# Patient Record
Sex: FEMALE | Race: WHITE | NOT HISPANIC OR LATINO | URBAN - METROPOLITAN AREA
[De-identification: names, ages, dates, MRNs, and addresses within clinical notes are randomized per-mention and may not be internally consistent; named-entity substitution may affect disease eponyms.]

---

## 2018-06-21 ENCOUNTER — OUTPATIENT (OUTPATIENT)
Dept: OUTPATIENT SERVICES | Facility: HOSPITAL | Age: 78
LOS: 1 days | Discharge: HOME | End: 2018-06-21

## 2018-06-21 DIAGNOSIS — Z12.31 ENCOUNTER FOR SCREENING MAMMOGRAM FOR MALIGNANT NEOPLASM OF BREAST: ICD-10-CM

## 2019-10-07 ENCOUNTER — OUTPATIENT (OUTPATIENT)
Dept: OUTPATIENT SERVICES | Facility: HOSPITAL | Age: 79
LOS: 1 days | Discharge: HOME | End: 2019-10-07
Payer: MEDICARE

## 2019-10-07 DIAGNOSIS — Z12.31 ENCOUNTER FOR SCREENING MAMMOGRAM FOR MALIGNANT NEOPLASM OF BREAST: ICD-10-CM

## 2019-10-07 PROCEDURE — 77067 SCR MAMMO BI INCL CAD: CPT | Mod: 26

## 2019-10-07 PROCEDURE — 77063 BREAST TOMOSYNTHESIS BI: CPT | Mod: 26

## 2020-01-02 PROBLEM — Z00.00 ENCOUNTER FOR PREVENTIVE HEALTH EXAMINATION: Status: ACTIVE | Noted: 2020-01-02

## 2020-02-18 ENCOUNTER — OUTPATIENT (OUTPATIENT)
Dept: OUTPATIENT SERVICES | Facility: HOSPITAL | Age: 80
LOS: 1 days | Discharge: HOME | End: 2020-02-18

## 2020-02-19 DIAGNOSIS — M89.9 DISORDER OF BONE, UNSPECIFIED: ICD-10-CM

## 2020-02-19 DIAGNOSIS — Z78.0 ASYMPTOMATIC MENOPAUSAL STATE: ICD-10-CM

## 2020-02-19 DIAGNOSIS — Z13.820 ENCOUNTER FOR SCREENING FOR OSTEOPOROSIS: ICD-10-CM

## 2021-01-13 ENCOUNTER — OUTPATIENT (OUTPATIENT)
Dept: OUTPATIENT SERVICES | Facility: HOSPITAL | Age: 81
LOS: 1 days | Discharge: HOME | End: 2021-01-13
Payer: MEDICARE

## 2021-01-13 DIAGNOSIS — Z12.31 ENCOUNTER FOR SCREENING MAMMOGRAM FOR MALIGNANT NEOPLASM OF BREAST: ICD-10-CM

## 2021-01-13 PROCEDURE — 77067 SCR MAMMO BI INCL CAD: CPT | Mod: 26

## 2021-01-13 PROCEDURE — 77063 BREAST TOMOSYNTHESIS BI: CPT | Mod: 26

## 2022-03-22 ENCOUNTER — OUTPATIENT (OUTPATIENT)
Dept: OUTPATIENT SERVICES | Facility: HOSPITAL | Age: 82
LOS: 1 days | Discharge: HOME | End: 2022-03-22
Payer: MEDICARE

## 2022-03-22 DIAGNOSIS — Z12.31 ENCOUNTER FOR SCREENING MAMMOGRAM FOR MALIGNANT NEOPLASM OF BREAST: ICD-10-CM

## 2022-03-22 PROCEDURE — 77063 BREAST TOMOSYNTHESIS BI: CPT | Mod: 26

## 2022-03-22 PROCEDURE — 77067 SCR MAMMO BI INCL CAD: CPT | Mod: 26

## 2022-03-23 DIAGNOSIS — Z13.820 ENCOUNTER FOR SCREENING FOR OSTEOPOROSIS: ICD-10-CM

## 2022-03-23 DIAGNOSIS — M89.9 DISORDER OF BONE, UNSPECIFIED: ICD-10-CM

## 2022-03-23 DIAGNOSIS — Z78.0 ASYMPTOMATIC MENOPAUSAL STATE: ICD-10-CM

## 2022-10-07 ENCOUNTER — APPOINTMENT (OUTPATIENT)
Dept: ORTHOPEDIC SURGERY | Facility: CLINIC | Age: 82
End: 2022-10-07

## 2022-10-07 DIAGNOSIS — E11.9 TYPE 2 DIABETES MELLITUS W/OUT COMPLICATIONS: ICD-10-CM

## 2022-10-07 PROCEDURE — 20610 DRAIN/INJ JOINT/BURSA W/O US: CPT | Mod: LT

## 2022-10-07 PROCEDURE — 99213 OFFICE O/P EST LOW 20 MIN: CPT | Mod: 25

## 2022-10-07 NOTE — IMAGING
[de-identified] : Left Knee: Palpation of the knee is as follows:  pain to palpation medial and lateral joint line Knee Range of Motion is as follows:  good range of motion with pain to medial lateral joint line Strength examination of the knee is as follows:  good strength throughout Gait and function is as follows:  ambulation with cane

## 2022-10-07 NOTE — DISCUSSION/SUMMARY
[de-identified] : Discussed x-rays with patient showing severe osteoarthritis with medial joint line narrowing.  Discussed treatment options patient including rest, anti-inflammatories, range of motion exercise, physical therapy, cortisone injection,  gel injection, knee replacement surgery. patient agreed to cortisone injection.  Patient states her diabetes is well controlled.\par \par Dexamethasone and Lidocaine  \par \par Anesthesia: ethyl chloride sprayed topically. Dexamethasone 20mg/5mL 2 cc.  \par \par Lidocaine 1%: 2 cc.  \par \par   \par \par Medication was injected in the left knee after verbal consent using sterile preparation and technique. The risks, benefits, and alternatives to cortisone injection were explained in full to the patient. Risks outlined include but are not limited to infection, sepsis, bleeding, scarring, skin discoloration, temporary increase in pain, syncopal episode, failure to resolve symptoms, allergic reaction, symptom recurrence, and elevation of blood sugar in diabetics. Patient understood the risks. All questions were answered. After discussion of options, patient requested an injection. Oral informed consent was obtained and sterile prep was done of the injection site. Sterile technique was utilized for the procedure including the preparation of the solutions used for the injection. Patient tolerated the procedure well. Advised to ice the injection site this evening. \par \par  follow-up in 4 months for re-evaluation.  Call if any questions or concerns.  Patient understands agrees with plan.  Seen under supervision of Dr. Hayden. \par

## 2022-10-07 NOTE — HISTORY OF PRESENT ILLNESS
[de-identified] : Patient is an 82 year old female here for follow-up left knee osteoarthritis.  Patient states overall her last cortisone injection helped greatly.  Patient states over the past 2 weeks her pain has started to worsen again without injury or trauma.  Patient is interested in repeat cortisone injection.  Denies numbness and tingling, denies instability.

## 2023-01-06 ENCOUNTER — INPATIENT (INPATIENT)
Facility: HOSPITAL | Age: 83
LOS: 6 days | Discharge: NURSING, REHAB OR LONG-TERM CARE | End: 2023-01-13
Attending: HOSPITALIST | Admitting: HOSPITALIST
Payer: MEDICARE

## 2023-01-06 ENCOUNTER — EMERGENCY (EMERGENCY)
Facility: HOSPITAL | Age: 83
LOS: 0 days | Discharge: HOME | End: 2023-01-06
Payer: MEDICARE

## 2023-01-06 VITALS
SYSTOLIC BLOOD PRESSURE: 122 MMHG | RESPIRATION RATE: 20 BRPM | HEART RATE: 80 BPM | DIASTOLIC BLOOD PRESSURE: 58 MMHG | OXYGEN SATURATION: 97 % | TEMPERATURE: 100 F

## 2023-01-06 DIAGNOSIS — Z02.9 ENCOUNTER FOR ADMINISTRATIVE EXAMINATIONS, UNSPECIFIED: ICD-10-CM

## 2023-01-06 LAB
ALBUMIN SERPL ELPH-MCNC: 3.7 G/DL — SIGNIFICANT CHANGE UP (ref 3.5–5.2)
ALP SERPL-CCNC: 96 U/L — SIGNIFICANT CHANGE UP (ref 30–115)
ALT FLD-CCNC: 13 U/L — SIGNIFICANT CHANGE UP (ref 0–41)
ANION GAP SERPL CALC-SCNC: 12 MMOL/L — SIGNIFICANT CHANGE UP (ref 7–14)
APPEARANCE UR: ABNORMAL
APTT BLD: 27.8 SEC — SIGNIFICANT CHANGE UP (ref 27–39.2)
AST SERPL-CCNC: 35 U/L — SIGNIFICANT CHANGE UP (ref 0–41)
BACTERIA # UR AUTO: ABNORMAL
BASE EXCESS BLDV CALC-SCNC: 2.7 MMOL/L — SIGNIFICANT CHANGE UP (ref -2–3)
BASOPHILS # BLD AUTO: 0.03 K/UL — SIGNIFICANT CHANGE UP (ref 0–0.2)
BASOPHILS NFR BLD AUTO: 0.3 % — SIGNIFICANT CHANGE UP (ref 0–1)
BILIRUB SERPL-MCNC: 1.4 MG/DL — HIGH (ref 0.2–1.2)
BILIRUB UR-MCNC: NEGATIVE — SIGNIFICANT CHANGE UP
BUN SERPL-MCNC: 20 MG/DL — SIGNIFICANT CHANGE UP (ref 10–20)
CA-I SERPL-SCNC: 1.2 MMOL/L — SIGNIFICANT CHANGE UP (ref 1.15–1.33)
CALCIUM SERPL-MCNC: 9.4 MG/DL — SIGNIFICANT CHANGE UP (ref 8.4–10.5)
CHLORIDE SERPL-SCNC: 99 MMOL/L — SIGNIFICANT CHANGE UP (ref 98–110)
CO2 SERPL-SCNC: 27 MMOL/L — SIGNIFICANT CHANGE UP (ref 17–32)
COLOR SPEC: YELLOW — SIGNIFICANT CHANGE UP
CREAT SERPL-MCNC: 0.9 MG/DL — SIGNIFICANT CHANGE UP (ref 0.7–1.5)
DIFF PNL FLD: ABNORMAL
EGFR: 65 ML/MIN/1.73M2 — SIGNIFICANT CHANGE UP
EOSINOPHIL # BLD AUTO: 0.05 K/UL — SIGNIFICANT CHANGE UP (ref 0–0.7)
EOSINOPHIL NFR BLD AUTO: 0.5 % — SIGNIFICANT CHANGE UP (ref 0–8)
EPI CELLS # UR: 6 /HPF — HIGH (ref 0–5)
FLUAV AG NPH QL: SIGNIFICANT CHANGE UP
FLUBV AG NPH QL: SIGNIFICANT CHANGE UP
GAS PNL BLDV: 135 MMOL/L — LOW (ref 136–145)
GAS PNL BLDV: SIGNIFICANT CHANGE UP
GAS PNL BLDV: SIGNIFICANT CHANGE UP
GLUCOSE SERPL-MCNC: 105 MG/DL — HIGH (ref 70–99)
GLUCOSE UR QL: NEGATIVE — SIGNIFICANT CHANGE UP
HCO3 BLDV-SCNC: 29 MMOL/L — SIGNIFICANT CHANGE UP (ref 22–29)
HCT VFR BLD CALC: 41.1 % — SIGNIFICANT CHANGE UP (ref 37–47)
HCT VFR BLDA CALC: 41 % — SIGNIFICANT CHANGE UP (ref 39–51)
HGB BLD CALC-MCNC: 13.7 G/DL — SIGNIFICANT CHANGE UP (ref 12.6–17.4)
HGB BLD-MCNC: 13.2 G/DL — SIGNIFICANT CHANGE UP (ref 12–16)
HYALINE CASTS # UR AUTO: 2 /LPF — SIGNIFICANT CHANGE UP (ref 0–7)
IMM GRANULOCYTES NFR BLD AUTO: 0.4 % — HIGH (ref 0.1–0.3)
INR BLD: 1.08 RATIO — SIGNIFICANT CHANGE UP (ref 0.65–1.3)
KETONES UR-MCNC: SIGNIFICANT CHANGE UP
LACTATE BLDV-MCNC: 1.7 MMOL/L — SIGNIFICANT CHANGE UP (ref 0.5–2)
LEUKOCYTE ESTERASE UR-ACNC: ABNORMAL
LYMPHOCYTES # BLD AUTO: 0.55 K/UL — LOW (ref 1.2–3.4)
LYMPHOCYTES # BLD AUTO: 5.3 % — LOW (ref 20.5–51.1)
MCHC RBC-ENTMCNC: 26.9 PG — LOW (ref 27–31)
MCHC RBC-ENTMCNC: 32.1 G/DL — SIGNIFICANT CHANGE UP (ref 32–37)
MCV RBC AUTO: 83.9 FL — SIGNIFICANT CHANGE UP (ref 81–99)
MONOCYTES # BLD AUTO: 0.95 K/UL — HIGH (ref 0.1–0.6)
MONOCYTES NFR BLD AUTO: 9.1 % — SIGNIFICANT CHANGE UP (ref 1.7–9.3)
NEUTROPHILS # BLD AUTO: 8.85 K/UL — HIGH (ref 1.4–6.5)
NEUTROPHILS NFR BLD AUTO: 84.4 % — HIGH (ref 42.2–75.2)
NITRITE UR-MCNC: NEGATIVE — SIGNIFICANT CHANGE UP
NRBC # BLD: 0 /100 WBCS — SIGNIFICANT CHANGE UP (ref 0–0)
PCO2 BLDV: 50 MMHG — HIGH (ref 39–42)
PH BLDV: 7.37 — SIGNIFICANT CHANGE UP (ref 7.32–7.43)
PH UR: 6 — SIGNIFICANT CHANGE UP (ref 5–8)
PLATELET # BLD AUTO: 227 K/UL — SIGNIFICANT CHANGE UP (ref 130–400)
PO2 BLDV: 22 MMHG — SIGNIFICANT CHANGE UP
POTASSIUM BLDV-SCNC: 3.7 MMOL/L — SIGNIFICANT CHANGE UP (ref 3.5–5.1)
POTASSIUM SERPL-MCNC: 4.2 MMOL/L — SIGNIFICANT CHANGE UP (ref 3.5–5)
POTASSIUM SERPL-SCNC: 4.2 MMOL/L — SIGNIFICANT CHANGE UP (ref 3.5–5)
PROT SERPL-MCNC: 6.5 G/DL — SIGNIFICANT CHANGE UP (ref 6–8)
PROT UR-MCNC: SIGNIFICANT CHANGE UP
PROTHROM AB SERPL-ACNC: 12.4 SEC — SIGNIFICANT CHANGE UP (ref 9.95–12.87)
RBC # BLD: 4.9 M/UL — SIGNIFICANT CHANGE UP (ref 4.2–5.4)
RBC # FLD: 13.8 % — SIGNIFICANT CHANGE UP (ref 11.5–14.5)
RBC CASTS # UR COMP ASSIST: 17 /HPF — HIGH (ref 0–4)
RSV RNA NPH QL NAA+NON-PROBE: SIGNIFICANT CHANGE UP
SAO2 % BLDV: 33.3 % — SIGNIFICANT CHANGE UP
SARS-COV-2 RNA SPEC QL NAA+PROBE: DETECTED
SODIUM SERPL-SCNC: 138 MMOL/L — SIGNIFICANT CHANGE UP (ref 135–146)
SP GR SPEC: 1.02 — SIGNIFICANT CHANGE UP (ref 1.01–1.03)
TROPONIN T SERPL-MCNC: <0.01 NG/ML — SIGNIFICANT CHANGE UP
UROBILINOGEN FLD QL: ABNORMAL
WBC # BLD: 10.47 K/UL — SIGNIFICANT CHANGE UP (ref 4.8–10.8)
WBC # FLD AUTO: 10.47 K/UL — SIGNIFICANT CHANGE UP (ref 4.8–10.8)
WBC UR QL: 8 /HPF — HIGH (ref 0–5)

## 2023-01-06 PROCEDURE — L9981: CPT

## 2023-01-06 PROCEDURE — 93010 ELECTROCARDIOGRAM REPORT: CPT

## 2023-01-06 PROCEDURE — 72170 X-RAY EXAM OF PELVIS: CPT | Mod: 26

## 2023-01-06 PROCEDURE — 99285 EMERGENCY DEPT VISIT HI MDM: CPT

## 2023-01-06 PROCEDURE — 72125 CT NECK SPINE W/O DYE: CPT | Mod: 26,MA

## 2023-01-06 PROCEDURE — 70450 CT HEAD/BRAIN W/O DYE: CPT | Mod: 26,MA

## 2023-01-06 PROCEDURE — 71045 X-RAY EXAM CHEST 1 VIEW: CPT | Mod: 26

## 2023-01-06 RX ORDER — ACETAMINOPHEN 500 MG
650 TABLET ORAL ONCE
Refills: 0 | Status: COMPLETED | OUTPATIENT
Start: 2023-01-06 | End: 2023-01-06

## 2023-01-06 RX ORDER — CEFTRIAXONE 500 MG/1
1000 INJECTION, POWDER, FOR SOLUTION INTRAMUSCULAR; INTRAVENOUS ONCE
Refills: 0 | Status: COMPLETED | OUTPATIENT
Start: 2023-01-06 | End: 2023-01-06

## 2023-01-06 RX ADMIN — CEFTRIAXONE 100 MILLIGRAM(S): 500 INJECTION, POWDER, FOR SOLUTION INTRAMUSCULAR; INTRAVENOUS at 19:07

## 2023-01-06 RX ADMIN — Medication 650 MILLIGRAM(S): at 22:51

## 2023-01-06 NOTE — ED PROVIDER NOTE - CLINICAL SUMMARY MEDICAL DECISION MAKING FREE TEXT BOX
82-year-old female with past medical history of diabetes, high blood pressure, hyperlipidemia, osteoarthritis, lives at home alone, presents status post fall patient reports she was getting out of the bath felt dizzy more like lightheadedness where she felt like she was going to pass out but never had syncope, fell forward to her knees never hit her head no LOC.  No seizure-like activity, no urinary bowel incontinence, no foaming at the mouth, no tongue biting.  Grandson helped her up off the floor.  Grandson reports that she lives at home alone.  Grandson has been recently sick with COVID.  Pt denies any symptoms at this time and reports baseline. no longer feel dizzy/lh.  Recalls all events. Denies fever, chills, n/v, cp,  pleuritic cp, sob, palpitations, diaphoresis, cough, chest wall/rib pain, clavicular pain, ankle pain, knee pain, shoulder pain, wrist pain, no back pain, no hip pain, no current ha/lh/dizziness, numbness/tingling, neck pain/ stiffness, abd pain, diarrhea, constipation, melena/brbpr, urinary symptoms, edema, calf pain/swelling/erythema, sick contacts, recent travel. GCS 15.    On Exam:  Vital Signs: I have reviewed the initial vital signs.  Constitutional: Non toxic appearing pt sitting on stretcher in nad.   HEAD: No signs of basilar skull fracture.  Integumentary: No rash. No laceration, abrasions, ecchymoses or swelling.   EYES: No periorbital swelling/ecchymoses, EOM intact. No nystagmus. No subconjunctival hemorrhage.   ENT: MMM. No rhinorrhea/otorrhea. No epistaxis,  No septal hematoma. No mastoid ecchymoses. No intraoral bleeding, No loose or cracked teeth, no active bleeding. No malocclusion. No TMJ pain.  NECK: Supple, non-tender, No palpable shelves or step-offs.  BACK: No spinous tenderness. No palpable shelves or step-offs.  Cardiovascular: RRR, radial pulses 2/4 b/l. dp and pt pulses 2/4/ b/l. No pain to palpation to chest wall.  Respiratory: BS present b/l, ctabl, no wheezing or crackles, no stridor. No pain to palpation to ribs b/l. No crepitus. No subq emphysema.   Gastrointestinal: BS present throughout all 4 quadrants, soft, nd, nt. no r/g.  Musculoskeletal: FROM of all extremities. No pain to palpation to b/l knees, femurs, tib-fib, No bony tenderness. No pain to palpation to clavicles, no obvious bony deformities. No hip pain. No short leg. No internal or external rotation of LE  Neurologic: GCS 15. CN II-XII intact, no facial droop or slurring of speech. Motor 5/5 and sensation intact throughout upper and lower extremities. (-) Pronator. NIHSS 0.    Labs and EKG were ordered and reviewed.  Imaging was ordered and reviewed by me.  Appropriate medications for patient's presenting complaints were ordered and effects were reassessed.  No previous ed visits or hospitalizations noted. Additional history was obtained from family.  Escalation to admission/observation was considered.  Patient requires inpatient hospitalization - monitored setting. Pt fell after feeling lh, repeat ct with no acute pathology, ekg with no dianna or elevation, no signs of arrythmia, trop negative, pt with covid and generally weak, imaging reviewed, no acute trauma, medical admitting team aware of pt and admission

## 2023-01-06 NOTE — ED PROVIDER NOTE - ATTENDING CONTRIBUTION TO CARE
82-year-old female with past medical history of diabetes, high blood pressure, hyperlipidemia, osteoarthritis, lives at home alone, presents status post fall patient reports she was getting out of the bath felt dizzy more like lightheadedness where she felt like she was going to pass out but never had syncope, fell forward to her knees never hit her head no LOC.  No seizure-like activity, no urinary bowel incontinence, no foaming at the mouth, no tongue biting.  Grandson helped her up off the floor.  Grandson reports that she lives at home alone.  Grandson has been recently sick with COVID.  Pt denies any symptoms at this time and reports baseline. no longer feel dizzy/lh.  Recalls all events. Denies fever, chills, n/v, cp,  pleuritic cp, sob, palpitations, diaphoresis, cough, chest wall/rib pain, clavicular pain, ankle pain, knee pain, shoulder pain, wrist pain, no back pain, no hip pain, no current ha/lh/dizziness, numbness/tingling, neck pain/ stiffness, abd pain, diarrhea, constipation, melena/brbpr, urinary symptoms, edema, calf pain/swelling/erythema, sick contacts, recent travel. GCS 15.    On Exam:  Vital Signs: I have reviewed the initial vital signs.  Constitutional: Non toxic appearing pt sitting on stretcher in nad.   HEAD: No signs of basilar skull fracture.  Integumentary: No rash. No laceration, abrasions, ecchymoses or swelling.   EYES: No periorbital swelling/ecchymoses, EOM intact. No nystagmus. No subconjunctival hemorrhage.   ENT: MMM. No rhinorrhea/otorrhea. No epistaxis,  No septal hematoma. No mastoid ecchymoses. No intraoral bleeding, No loose or cracked teeth, no active bleeding. No malocclusion. No TMJ pain.  NECK: Supple, non-tender, No palpable shelves or step-offs.  BACK: No spinous tenderness. No palpable shelves or step-offs.  Cardiovascular: RRR, radial pulses 2/4 b/l. dp and pt pulses 2/4/ b/l. No pain to palpation to chest wall.  Respiratory: BS present b/l, ctabl, no wheezing or crackles, no stridor. No pain to palpation to ribs b/l. No crepitus. No subq emphysema.   Gastrointestinal: BS present throughout all 4 quadrants, soft, nd, nt. no r/g.  Musculoskeletal: FROM of all extremities. No pain to palpation to b/l knees, femurs, tib-fib, No bony tenderness. No pain to palpation to clavicles, no obvious bony deformities. No hip pain. No short leg. No internal or external rotation of LE  Neurologic: GCS 15. CN II-XII intact, no facial droop or slurring of speech. Motor 5/5 and sensation intact throughout upper and lower extremities. (-) Pronator. NIHSS 0.

## 2023-01-06 NOTE — ED ADULT TRIAGE NOTE - CHIEF COMPLAINT QUOTE
Pt states she felt dizzy and fell today, fell onto sitting position, no head injury, no use of blood thinners, fs 170 with ems pta

## 2023-01-06 NOTE — ED PROVIDER NOTE - INTERPRETATION
-- DO NOT REPLY / DO NOT REPLY ALL --  -- Message is from the [facility]--    [facility] [employer] Universal Screening: {POS/NE}    Pediatric Specialty Appointment Request    Name: Maksim Virgen  : 1947  MRN: [id number]    Caller Information       Type Contact Phone    2020 01:53 PM CST Phone (Incoming) Maksim Virgen (Self) [phone] (H)          Appointment requested with:  ***  Specialty:  {Specialty List:958736}    Reason for appointment:  ***    Referred by: ***    Insurance verified:  {Yes or No:[id number]}    Patient scheduling preference:  {AM PM:965537}    {DAY OF WEEK:119443}    Patient requests callback: {Yes/No:[id number]::\"No\"}   Callback phone number: ***    {Message Turnaround:240201}-- DO NOT REPLY / DO NOT REPLY ALL --  -- Message is from the [facility]--    [facility]-19 Universal Screening: {POS/NE}    {Message type:[id number]}     normal sinus rhythm

## 2023-01-06 NOTE — ED ADULT NURSE NOTE - OBJECTIVE STATEMENT
patient felt dizzy and fell on her butt. neg head trauma, neg blood thinners, denies pain upon assessment

## 2023-01-06 NOTE — ED PROVIDER NOTE - OBJECTIVE STATEMENT
83 yo female, PMHx of DM, presents with lightheadedness that she felt when she was moving from the bathroom to her room that made her lower herself to the ground on her knees, resolved on its own, no alleviating or aggravating factors, associated with a recent cough. Of note, patient's grandson tested Covid+. Patient lives alone. Denies head trauma, nausea, vomiting, chest pain, shortness of breath, abdominal pain, vision changes, fevers, chills.

## 2023-01-06 NOTE — ED PROVIDER NOTE - PROGRESS NOTE DETAILS
ED Attending SHAWNA Whitaker  Spoke to radiology, discussed well-circumscribed rounded hypodensity in the medulla, states more likely streak artifact but recommend repeat CT head and is unable to exclude hemorrhage or lesion.  Repeat CT head ordered.  Patient aware.

## 2023-01-06 NOTE — ED PROVIDER NOTE - CARE PLAN
Assessment and plan of treatment:	Plan: Monitor, EKG, CXR, pelvic xray, labs, ct head, neck, viral swab , reassess.   Principal Discharge DX:	Fall  Assessment and plan of treatment:	Plan: Monitor, EKG, CXR, pelvic xray, labs, ct head, neck, viral swab , reassess.  Secondary Diagnosis:	Lightheadedness  Secondary Diagnosis:	COVID-19   1

## 2023-01-06 NOTE — ED ADULT NURSE NOTE - NSIMPLEMENTINTERV_GEN_ALL_ED
Implemented All Fall with Harm Risk Interventions:  [place] to call system. Call bell, personal items and telephone within reach. Instruct patient to call for assistance. Room bathroom lighting operational. Non-slip footwear when patient is off stretcher. Physically safe environment: no spills, clutter or unnecessary equipment. Stretcher in lowest position, wheels locked, appropriate side rails in place. Provide visual cue, wrist band, yellow gown, etc. Monitor gait and stability. Monitor for mental status changes and reorient to person, place, and time. Review medications for side effects contributing to fall risk. Reinforce activity limits and safety measures with patient and family. Provide visual clues: red socks.

## 2023-01-06 NOTE — ED PROVIDER NOTE - NS ED ROS FT
GEN:  no fever, no chills, no generalized weakness  NEURO:  no headache, +dizziness  ENT: no sore throat, no runny nose  CV:  no chest pain, no palpitations  RESP:  no sob, +cough  GI:  no nausea, no vomiting, no abdominal pain, no diarrhea  :  no dysuria, no urinary frequency, no hematuria  MSK:  no joint pain, no edema  SKIN:  no rash, no bruising

## 2023-01-06 NOTE — ED PROVIDER NOTE - PHYSICAL EXAMINATION
CONSTITUTIONAL: Well-developed; well-nourished; in no acute distress.   SKIN: warm, dry  HEAD: Normocephalic  EYES: no conjunctival injection  NECK: Supple; non tender.  CARD: S1, S2 normal. Regular rate and rhythm.   RESP: No wheezes, rales or rhonchi.  ABD: soft ntnd  EXT: Normal ROM.  No clubbing, cyanosis or edema.   NEURO: Alert, oriented, grossly unremarkable.  PSYCH: Cooperative, appropriate.

## 2023-01-07 LAB
A1C WITH ESTIMATED AVERAGE GLUCOSE RESULT: 5.5 % — SIGNIFICANT CHANGE UP (ref 4–5.6)
CHOLEST SERPL-MCNC: 108 MG/DL — SIGNIFICANT CHANGE UP
CRP SERPL-MCNC: 60.7 MG/L — HIGH
D DIMER BLD IA.RAPID-MCNC: 1071 NG/ML DDU — HIGH
ERYTHROCYTE [SEDIMENTATION RATE] IN BLOOD: 71 MM/HR — HIGH (ref 0–20)
ESTIMATED AVERAGE GLUCOSE: 111 MG/DL — SIGNIFICANT CHANGE UP (ref 68–114)
GLUCOSE BLDC GLUCOMTR-MCNC: 100 MG/DL — HIGH (ref 70–99)
GLUCOSE BLDC GLUCOMTR-MCNC: 100 MG/DL — HIGH (ref 70–99)
GLUCOSE BLDC GLUCOMTR-MCNC: 111 MG/DL — HIGH (ref 70–99)
GLUCOSE BLDC GLUCOMTR-MCNC: 124 MG/DL — HIGH (ref 70–99)
GLUCOSE BLDC GLUCOMTR-MCNC: 88 MG/DL — SIGNIFICANT CHANGE UP (ref 70–99)
HDLC SERPL-MCNC: 37 MG/DL — LOW
IRON SATN MFR SERPL: 11 % — LOW (ref 15–50)
IRON SATN MFR SERPL: 27 UG/DL — LOW (ref 35–150)
LACTATE SERPL-SCNC: 1.1 MMOL/L — SIGNIFICANT CHANGE UP (ref 0.7–2)
LDH SERPL L TO P-CCNC: 310 — HIGH (ref 50–242)
LIPID PNL WITH DIRECT LDL SERPL: 56 MG/DL — SIGNIFICANT CHANGE UP
NON HDL CHOLESTEROL: 71 MG/DL — SIGNIFICANT CHANGE UP
NT-PROBNP SERPL-SCNC: 1267 PG/ML — HIGH (ref 0–300)
TIBC SERPL-MCNC: 241 UG/DL — SIGNIFICANT CHANGE UP (ref 220–430)
TRIGL SERPL-MCNC: 77 MG/DL — SIGNIFICANT CHANGE UP
TSH SERPL-MCNC: 2.6 UIU/ML — SIGNIFICANT CHANGE UP (ref 0.27–4.2)
UIBC SERPL-MCNC: 214 UG/DL — SIGNIFICANT CHANGE UP (ref 110–370)

## 2023-01-07 PROCEDURE — 99223 1ST HOSP IP/OBS HIGH 75: CPT

## 2023-01-07 PROCEDURE — 93970 EXTREMITY STUDY: CPT | Mod: 26

## 2023-01-07 RX ORDER — ACETAMINOPHEN 500 MG
650 TABLET ORAL EVERY 6 HOURS
Refills: 0 | Status: DISCONTINUED | OUTPATIENT
Start: 2023-01-07 | End: 2023-01-11

## 2023-01-07 RX ORDER — DEXTROSE 50 % IN WATER 50 %
25 SYRINGE (ML) INTRAVENOUS ONCE
Refills: 0 | Status: DISCONTINUED | OUTPATIENT
Start: 2023-01-07 | End: 2023-01-13

## 2023-01-07 RX ORDER — ENOXAPARIN SODIUM 100 MG/ML
40 INJECTION SUBCUTANEOUS EVERY 24 HOURS
Refills: 0 | Status: DISCONTINUED | OUTPATIENT
Start: 2023-01-07 | End: 2023-01-13

## 2023-01-07 RX ORDER — ATORVASTATIN CALCIUM 80 MG/1
20 TABLET, FILM COATED ORAL AT BEDTIME
Refills: 0 | Status: DISCONTINUED | OUTPATIENT
Start: 2023-01-07 | End: 2023-01-13

## 2023-01-07 RX ORDER — REMDESIVIR 5 MG/ML
INJECTION INTRAVENOUS
Refills: 0 | Status: DISCONTINUED | OUTPATIENT
Start: 2023-01-07 | End: 2023-01-10

## 2023-01-07 RX ORDER — GLUCAGON INJECTION, SOLUTION 0.5 MG/.1ML
1 INJECTION, SOLUTION SUBCUTANEOUS ONCE
Refills: 0 | Status: DISCONTINUED | OUTPATIENT
Start: 2023-01-07 | End: 2023-01-13

## 2023-01-07 RX ORDER — DEXTROSE 50 % IN WATER 50 %
12.5 SYRINGE (ML) INTRAVENOUS ONCE
Refills: 0 | Status: DISCONTINUED | OUTPATIENT
Start: 2023-01-07 | End: 2023-01-13

## 2023-01-07 RX ORDER — FUROSEMIDE 40 MG
20 TABLET ORAL ONCE
Refills: 0 | Status: COMPLETED | OUTPATIENT
Start: 2023-01-07 | End: 2023-01-07

## 2023-01-07 RX ORDER — PANTOPRAZOLE SODIUM 20 MG/1
40 TABLET, DELAYED RELEASE ORAL
Refills: 0 | Status: DISCONTINUED | OUTPATIENT
Start: 2023-01-07 | End: 2023-01-13

## 2023-01-07 RX ORDER — REMDESIVIR 5 MG/ML
200 INJECTION INTRAVENOUS EVERY 24 HOURS
Refills: 0 | Status: COMPLETED | OUTPATIENT
Start: 2023-01-07 | End: 2023-01-07

## 2023-01-07 RX ORDER — REMDESIVIR 5 MG/ML
100 INJECTION INTRAVENOUS EVERY 24 HOURS
Refills: 0 | Status: DISCONTINUED | OUTPATIENT
Start: 2023-01-08 | End: 2023-01-10

## 2023-01-07 RX ORDER — INSULIN LISPRO 100/ML
VIAL (ML) SUBCUTANEOUS
Refills: 0 | Status: DISCONTINUED | OUTPATIENT
Start: 2023-01-07 | End: 2023-01-13

## 2023-01-07 RX ORDER — OXYBUTYNIN CHLORIDE 5 MG
5 TABLET ORAL
Refills: 0 | Status: DISCONTINUED | OUTPATIENT
Start: 2023-01-07 | End: 2023-01-13

## 2023-01-07 RX ORDER — SODIUM CHLORIDE 9 MG/ML
1000 INJECTION, SOLUTION INTRAVENOUS
Refills: 0 | Status: DISCONTINUED | OUTPATIENT
Start: 2023-01-07 | End: 2023-01-13

## 2023-01-07 RX ORDER — DEXTROSE 50 % IN WATER 50 %
15 SYRINGE (ML) INTRAVENOUS ONCE
Refills: 0 | Status: DISCONTINUED | OUTPATIENT
Start: 2023-01-07 | End: 2023-01-13

## 2023-01-07 RX ORDER — ASPIRIN/CALCIUM CARB/MAGNESIUM 324 MG
81 TABLET ORAL DAILY
Refills: 0 | Status: DISCONTINUED | OUTPATIENT
Start: 2023-01-07 | End: 2023-01-13

## 2023-01-07 RX ORDER — LOSARTAN POTASSIUM 100 MG/1
50 TABLET, FILM COATED ORAL DAILY
Refills: 0 | Status: DISCONTINUED | OUTPATIENT
Start: 2023-01-07 | End: 2023-01-08

## 2023-01-07 RX ADMIN — Medication 5 MILLIGRAM(S): at 17:19

## 2023-01-07 RX ADMIN — Medication 20 MILLIGRAM(S): at 04:57

## 2023-01-07 RX ADMIN — REMDESIVIR 200 MILLIGRAM(S): 5 INJECTION INTRAVENOUS at 05:42

## 2023-01-07 RX ADMIN — ATORVASTATIN CALCIUM 20 MILLIGRAM(S): 80 TABLET, FILM COATED ORAL at 22:52

## 2023-01-07 RX ADMIN — ENOXAPARIN SODIUM 40 MILLIGRAM(S): 100 INJECTION SUBCUTANEOUS at 05:43

## 2023-01-07 RX ADMIN — LOSARTAN POTASSIUM 50 MILLIGRAM(S): 100 TABLET, FILM COATED ORAL at 05:44

## 2023-01-07 RX ADMIN — Medication 5 MILLIGRAM(S): at 05:43

## 2023-01-07 RX ADMIN — Medication 81 MILLIGRAM(S): at 12:31

## 2023-01-07 NOTE — H&P ADULT - ASSESSMENT
Plan:   This is an 82-year-old patient with Hx of HTN, DM, DL, presents with lightheadedness that she felt when she was moving from the bathroom to her room causing her to fall on her knees. She did not lose consciousness or hit her head. She's been complaining of cough and fever for the past 3-4 days , + sick contact (her grandson tested COVID+). She has decreased PO intake for 2 days.    Patient is admitted to [facility] for Covid 19 and fall.        Plan:    #Fall- most likely orthostatic hypotension from dehydration  #Covid +  - positive on 1/6/2022, symptoms started on 1/2/2022  - started RDV  - f/u ID cs  - saturating well on RA. no wheezing on exam or hx of COPD. No steroids for now  - no signs of bacterial pneumonia. monitor off abx for now  - f/u procal and inflam markers  - monitor on tele for any arrythmia  - f/u ProBNP    #DM  #DL  - hold home pioglitazone, metformin, and trulicity  - started on SS. monitor FS (goal 140-180)  - start insulin regimen if needed  - f/u A1c          #MISC  - DVT PPx: Lovenox 40 qd. adjust after D-dimer is resulted if needed.  - GI PPx: protonix 40 po daily  - Diet: DASH, Carbs restricted, low salt  - Activity; IAT. pending PT cs  - Dispo: Med tele This is an 82-year-old patient with Hx of HTN, DM, DL, presents with lightheadedness that she felt when she was moving from the bathroom to her room causing her to fall on her knees. She did not lose consciousness or hit her head. She's been complaining of cough and fever for the past 3-4 days , + sick contact (her grandson tested COVID+). She has decreased PO intake for 2 days.    Patient is admitted to [facility] for Covid 19 and fall.        Plan:    #Fall- most likely orthostatic hypotension from dehydration  #Covid +  - positive on 1/6/2022, symptoms started on 1/2/2022  - started RDV  - f/u ID cs  - saturating well on RA. no wheezing on exam or hx of COPD. No steroids for now  - no signs of bacterial pneumonia. monitor off abx for now  - f/u procal and inflam markers  - monitor on tele for any arrythmia  - f/u ProBNP      #DM  #DL  - hold home pioglitazone, metformin, and trulicity  - started on SS. monitor FS (goal 140-180)  - start insulin regimen if needed  - f/u A1c      #MISC  - DVT PPx: Lovenox 40 qd. adjust after D-dimer is resulted if needed.  - GI PPx: protonix 40 po daily  - Diet: DASH, Carbs restricted, low salt  - Activity; IAT. pending PT cs  - Dispo: Med tele This is an 82-year-old patient with Hx of HTN, DM, DL, presents with lightheadedness that she felt when she was moving from the bathroom to her room causing her to fall on her knees. She did not lose consciousness or hit her head. She's been complaining of cough and fever for the past 3-4 days , + sick contact (her grandson tested COVID+). She has decreased PO intake for 2 days.    Patient is admitted to [facility] for Covid 19 and fall.        Plan:    #Fall- most likely orthostatic hypotension from dehydration  #Covid +  - positive on 1/6/2022, symptoms started on 1/2/2022  - started RDV  - f/u ID cs  - saturating well on RA. no wheezing on exam. No steroids for now  - no signs of bacterial pneumonia. monitor off abx for now  - f/u procal and inflam markers  - monitor tele for any arrythmias. f/u TSH  - f/u ProBNP and echo  - UA positive but patient asymptomatic   - f/u orthostatics. if positive--> IVF       #DM  #DL  - hold home pioglitazone, metformin, and trulicity  - started on SS. monitor FS (goal 140-180)  - start insulin regimen if needed  - f/u A1c and lipid profile  - cw statin      #HTN  - cw home losartan and HCT      #MISC  - DVT PPx: Lovenox 40 qd. adjust after D-dimer is resulted if needed.  - GI PPx: protonix 40 po daily  - Diet: DASH, Carbs restricted, low salt  - Activity; IAT. pending PT cs  - Dispo: Med tele

## 2023-01-07 NOTE — H&P ADULT - NSHPPHYSICALEXAM_GEN_ALL_CORE
GENERAL: NAD, well-developed. AAOx3  HEAD:  Atraumatic, Normocephalic.  EYES: conjunctiva and sclera clear  CHEST/LUNG: GBAE. No wheezing or crackles   HEART: regular rate and rhythm; S1/S2.  ABDOMEN: Soft, Nontender, Nondistended  EXTREMITIES: +2 BLE edema.   NEUROLOGY: non-focal; moves all extremities

## 2023-01-07 NOTE — H&P ADULT - NSHPLABSRESULTS_GEN_ALL_CORE
13.2   10.47 )-----------( 227      ( 2023 16:06 )             41.1         138  |  99  |  20  ----------------------------<  105<H>  4.2   |  27  |  0.9    Ca    9.4      2023 16:06    TPro  6.5  /  Alb  3.7  /  TBili  1.4<H>  /  DBili  x   /  AST  35  /  ALT  13  /  AlkPhos  96  01-06    PT/INR - ( 2023 16:06 )   PT: 12.40 sec;   INR: 1.08 ratio         PTT - ( 2023 16:06 )  PTT:27.8 sec  Urinalysis Basic - ( 2023 17:53 )    Color: Yellow / Appearance: Slightly Turbid / S.019 / pH: x  Gluc: x / Ketone: Trace  / Bili: Negative / Urobili: 3 mg/dL   Blood: x / Protein: Trace / Nitrite: Negative   Leuk Esterase: Moderate / RBC: 17 /HPF / WBC 8 /HPF   Sq Epi: x / Non Sq Epi: 6 /HPF / Bacteria: Many        Troponin T, Serum: <0.01 ng/mL (23 @ 16:06)      CARDIAC MARKERS ( 2023 16:06 )  x     / <0.01 ng/mL / x     / x     / x

## 2023-01-07 NOTE — H&P ADULT - ATTENDING COMMENTS
This is an 82-year-old patient with Hx of HTN, DM, DL, presents with lightheadedness that she felt when she was moving from the bathroom to her room causing her to fall on her knees. She did not lose consciousness or hit her head. She's been complaining of cough and fever for the past 3-4 days , + sick contact (her grandson tested COVID+). She has decreased PO intake for 2 days.    Patient is admitted to [facility] for Covid 19 and fall.        Plan:    #Fall- most likely orthostatic hypotension from dehydration  #Covid +  - positive on 1/6/2022, symptoms started on 1/2/2022  - started RDV  - saturating well on RA. no wheezing on exam. No steroids for now  - no signs of bacterial pneumonia. monitor off abx for now  - f/u procal and inflam markers  -  f/u TSH  - f/u ProBNP   - UA positive but patient asymptomatic   - f/u orthostatics.   - ID consult appreciated      #DM  #DL  - hold home pioglitazone, metformin, and trulicity  - started on SS. monitor FS (goal 140-180)  - start insulin regimen if needed  - f/u A1c and lipid profile  - cw statin      #HTN  - cw home losartan and HCT      #MISC  - DVT PPx: Lovenox 40 qd. adjust after D-dimer is resulted if needed.  - GI PPx: protonix 40 po daily  - Diet: DASH, Carbs restricted, low salt  - Activity; IAT. pending PT cs  - Dispo: dispo based on PT george plan dc in 24 -48 hr

## 2023-01-07 NOTE — H&P ADULT - HISTORY OF PRESENT ILLNESS
This is an 82-year-old patient with Hx of HTN, DM, DL, presents with lightheadedness that she felt when she was moving from the bathroom to her room causing her to fall on her knees. She did not lose consciousness of hit her head. She's been complaining of cough and fever for the past 3-4 days , + sick contact (her grandson tested COVID+). She has decreased PO intake.   Patient lives alone, ambulates independently but has a cane and a walker PRN. She denies SOB, chest pain, N/V/D, urinary sx or abnormal BMs.    In the ED vitals were stable. normal O2 sat on RA  Labs:   EKG: NSR with PACs.  Trauma workup negative This is an 82-year-old patient with Hx of HTN, DM, DL, presents with lightheadedness that she felt when she was moving from the bathroom to her room causing her to fall on her knees. She did not lose consciousness or hit her head. She's been complaining of cough and fever for the past 3-4 days , + sick contact (her grandson tested COVID+). She has decreased PO intake for 2 days.  Patient lives alone, ambulates independently but has a cane and a walker PRN. She denies SOB, chest pain, N/V/D, urinary sx or abnormal BMs.    In the ED vitals were stable. normal O2 sat on RA.  Patient was found to be COVID+. Labs notable for T bili 1.5. otherwise normal. normal trops.  EKG: NSR with PACs.  Xray pelvis negative  CTH initially showed possible hemorrhage or mass lesion --> repeat head CT was negative    Patient is admitted to [facility] for Covid 19 and fall.   This is an 82-year-old patient with Hx of HTN, DM, DL, presents with lightheadedness that she felt when she was moving from the bathroom to her room causing her to fall on her knees. She did not lose consciousness or hit her head. She's been complaining of cough and fever for the past 3-4 days , + sick contact (her grandson tested COVID+). She has decreased PO intake for 2 days.  Patient lives alone, ambulates independently but has a cane and a walker PRN. She denies SOB, chest pain, N/V/D, urinary sx or abnormal BMs.    In the ED vitals were stable. normal O2 sat on RA.  Patient was found to be COVID+. Labs notable for T bili 1.5. otherwise normal. normal trops.  EKG: NSR with PACs.  Xray pelvis negative  CXR pending read  CTH initially showed possible hemorrhage or mass lesion --> repeat head CT was negative    Patient is admitted to [facility] for Covid 19 and fall.

## 2023-01-07 NOTE — CONSULT NOTE ADULT - ASSESSMENT
82-year-old patient with Hx of HTN, DM, DL, presents with lightheadedness that she felt when she was moving from the bathroom to her room causing her to fall on her knees. She did not lose consciousness or hit her head. She's been complaining of cough and fever for the past 3-4 days , + sick contact (her grandson tested COVID+).  82-year-old patient with Hx of HTN, DM, DL, presents with lightheadedness that she felt when she was moving from the bathroom to her room causing her to fall on her knees. She did not lose consciousness or hit her head. She's been complaining of cough and fever for the past 3-4 days , + sick contact (her grandson tested COVID+).     IMPRESSION;   COVID with a positive test and asymptomatic  On RA  CXR no GGO  S/p vaccination and no booster.   No bacterial PNA    RECOMMENDATIONS;    mg iv on Day 1, then 100 mg iv D2 and D3. If discharged earlier could change to po Paxlovid q12h for 5 days . Check with pharmacy for drug interactions.   Please do not hesitate to recall ID if any questions arise either through [employer] or through [employer]

## 2023-01-08 LAB
ALBUMIN SERPL ELPH-MCNC: 3.8 G/DL — SIGNIFICANT CHANGE UP (ref 3.5–5.2)
ALP SERPL-CCNC: 80 U/L — SIGNIFICANT CHANGE UP (ref 30–115)
ALT FLD-CCNC: 21 U/L — SIGNIFICANT CHANGE UP (ref 0–41)
ANION GAP SERPL CALC-SCNC: 12 MMOL/L — SIGNIFICANT CHANGE UP (ref 7–14)
AST SERPL-CCNC: 59 U/L — HIGH (ref 0–41)
BASOPHILS # BLD AUTO: 0.03 K/UL — SIGNIFICANT CHANGE UP (ref 0–0.2)
BASOPHILS NFR BLD AUTO: 0.4 % — SIGNIFICANT CHANGE UP (ref 0–1)
BILIRUB SERPL-MCNC: 0.8 MG/DL — SIGNIFICANT CHANGE UP (ref 0.2–1.2)
BUN SERPL-MCNC: 23 MG/DL — HIGH (ref 10–20)
CALCIUM SERPL-MCNC: 9.1 MG/DL — SIGNIFICANT CHANGE UP (ref 8.4–10.4)
CHLORIDE SERPL-SCNC: 100 MMOL/L — SIGNIFICANT CHANGE UP (ref 98–110)
CO2 SERPL-SCNC: 29 MMOL/L — SIGNIFICANT CHANGE UP (ref 17–32)
CREAT SERPL-MCNC: 0.8 MG/DL — SIGNIFICANT CHANGE UP (ref 0.7–1.5)
CULTURE RESULTS: SIGNIFICANT CHANGE UP
EGFR: 74 ML/MIN/1.73M2 — SIGNIFICANT CHANGE UP
EOSINOPHIL # BLD AUTO: 0.15 K/UL — SIGNIFICANT CHANGE UP (ref 0–0.7)
EOSINOPHIL NFR BLD AUTO: 2.1 % — SIGNIFICANT CHANGE UP (ref 0–8)
FERRITIN SERPL-MCNC: 176 NG/ML — HIGH (ref 15–150)
GLUCOSE BLDC GLUCOMTR-MCNC: 103 MG/DL — HIGH (ref 70–99)
GLUCOSE BLDC GLUCOMTR-MCNC: 110 MG/DL — HIGH (ref 70–99)
GLUCOSE BLDC GLUCOMTR-MCNC: 145 MG/DL — HIGH (ref 70–99)
GLUCOSE BLDC GLUCOMTR-MCNC: 150 MG/DL — HIGH (ref 70–99)
GLUCOSE SERPL-MCNC: 85 MG/DL — SIGNIFICANT CHANGE UP (ref 70–99)
HCT VFR BLD CALC: 39.3 % — SIGNIFICANT CHANGE UP (ref 37–47)
HGB BLD-MCNC: 12.8 G/DL — SIGNIFICANT CHANGE UP (ref 12–16)
IMM GRANULOCYTES NFR BLD AUTO: 0.3 % — SIGNIFICANT CHANGE UP (ref 0.1–0.3)
LYMPHOCYTES # BLD AUTO: 1.35 K/UL — SIGNIFICANT CHANGE UP (ref 1.2–3.4)
LYMPHOCYTES # BLD AUTO: 18.9 % — LOW (ref 20.5–51.1)
MAGNESIUM SERPL-MCNC: 1.5 MG/DL — LOW (ref 1.8–2.4)
MCHC RBC-ENTMCNC: 27 PG — SIGNIFICANT CHANGE UP (ref 27–31)
MCHC RBC-ENTMCNC: 32.6 G/DL — SIGNIFICANT CHANGE UP (ref 32–37)
MCV RBC AUTO: 82.9 FL — SIGNIFICANT CHANGE UP (ref 81–99)
MONOCYTES # BLD AUTO: 0.88 K/UL — HIGH (ref 0.1–0.6)
MONOCYTES NFR BLD AUTO: 12.3 % — HIGH (ref 1.7–9.3)
NEUTROPHILS # BLD AUTO: 4.73 K/UL — SIGNIFICANT CHANGE UP (ref 1.4–6.5)
NEUTROPHILS NFR BLD AUTO: 66 % — SIGNIFICANT CHANGE UP (ref 42.2–75.2)
NRBC # BLD: 0 /100 WBCS — SIGNIFICANT CHANGE UP (ref 0–0)
PLATELET # BLD AUTO: 220 K/UL — SIGNIFICANT CHANGE UP (ref 130–400)
POTASSIUM SERPL-MCNC: 3.6 MMOL/L — SIGNIFICANT CHANGE UP (ref 3.5–5)
POTASSIUM SERPL-SCNC: 3.6 MMOL/L — SIGNIFICANT CHANGE UP (ref 3.5–5)
PROCALCITONIN SERPL-MCNC: 0.26 NG/ML — HIGH (ref 0.02–0.1)
PROT SERPL-MCNC: 6.2 G/DL — SIGNIFICANT CHANGE UP (ref 6–8)
RBC # BLD: 4.74 M/UL — SIGNIFICANT CHANGE UP (ref 4.2–5.4)
RBC # FLD: 13.8 % — SIGNIFICANT CHANGE UP (ref 11.5–14.5)
SODIUM SERPL-SCNC: 141 MMOL/L — SIGNIFICANT CHANGE UP (ref 135–146)
SPECIMEN SOURCE: SIGNIFICANT CHANGE UP
WBC # BLD: 7.16 K/UL — SIGNIFICANT CHANGE UP (ref 4.8–10.8)
WBC # FLD AUTO: 7.16 K/UL — SIGNIFICANT CHANGE UP (ref 4.8–10.8)

## 2023-01-08 RX ORDER — MAGNESIUM SULFATE 500 MG/ML
2 VIAL (ML) INJECTION ONCE
Refills: 0 | Status: COMPLETED | OUTPATIENT
Start: 2023-01-08 | End: 2023-01-08

## 2023-01-08 RX ADMIN — PANTOPRAZOLE SODIUM 40 MILLIGRAM(S): 20 TABLET, DELAYED RELEASE ORAL at 10:39

## 2023-01-08 RX ADMIN — ATORVASTATIN CALCIUM 20 MILLIGRAM(S): 80 TABLET, FILM COATED ORAL at 22:41

## 2023-01-08 RX ADMIN — Medication 5 MILLIGRAM(S): at 17:45

## 2023-01-08 RX ADMIN — Medication 25 GRAM(S): at 13:07

## 2023-01-08 RX ADMIN — Medication 5 MILLIGRAM(S): at 05:11

## 2023-01-08 RX ADMIN — Medication 81 MILLIGRAM(S): at 17:45

## 2023-01-08 RX ADMIN — REMDESIVIR 200 MILLIGRAM(S): 5 INJECTION INTRAVENOUS at 10:38

## 2023-01-08 RX ADMIN — LOSARTAN POTASSIUM 50 MILLIGRAM(S): 100 TABLET, FILM COATED ORAL at 05:11

## 2023-01-08 RX ADMIN — ENOXAPARIN SODIUM 40 MILLIGRAM(S): 100 INJECTION SUBCUTANEOUS at 06:03

## 2023-01-08 NOTE — PROGRESS NOTE ADULT - SUBJECTIVE AND OBJECTIVE BOX
SUBJECTIVE:    Patient is a 82y old Female who presents with a chief complaint of Fall s/p lightheadedness (2023 08:18)    Currently admitted to medicine with the primary diagnosis of Fall       Today is hospital day 2d. This morning she is resting comfortably in bed and reports no new issues or overnight events.         ALLERGIES:  No Known Allergies    MEDICATIONS:  STANDING MEDICATIONS  aspirin enteric coated 81 milliGRAM(s) Oral daily  atorvastatin 20 milliGRAM(s) Oral at bedtime  dextrose 5%. 1000 milliLiter(s) IV Continuous <Continuous>  dextrose 5%. 1000 milliLiter(s) IV Continuous <Continuous>  dextrose 50% Injectable 25 Gram(s) IV Push once  dextrose 50% Injectable 12.5 Gram(s) IV Push once  dextrose 50% Injectable 25 Gram(s) IV Push once  enoxaparin Injectable 40 milliGRAM(s) SubCutaneous every 24 hours  glucagon  Injectable 1 milliGRAM(s) IntraMuscular once  hydrochlorothiazide 12.5 milliGRAM(s) Oral daily  insulin lispro (ADMELOG) corrective regimen sliding scale   SubCutaneous Before meals and at bedtime  losartan 50 milliGRAM(s) Oral daily  magnesium sulfate  IVPB 2 Gram(s) IV Intermittent once  oxybutynin 5 milliGRAM(s) Oral two times a day  pantoprazole    Tablet 40 milliGRAM(s) Oral before breakfast  remdesivir  IVPB   IV Intermittent   remdesivir  IVPB 100 milliGRAM(s) IV Intermittent every 24 hours    PRN MEDICATIONS  acetaminophen     Tablet .. 650 milliGRAM(s) Oral every 6 hours PRN  dextrose Oral Gel 15 Gram(s) Oral once PRN    VITALS:   T(F): 97.6  HR: 84  BP: 103/55  RR: 18  SpO2: 98%    LABS:                        12.8   7.16  )-----------( 220      ( 2023 07:11 )             39.3     01-08    141  |  100  |  23<H>  ----------------------------<  85  3.6   |  29  |  0.8    Ca    9.1      2023 07:11  Mg     1.5     -08    TPro  6.2  /  Alb  3.8  /  TBili  0.8  /  DBili  x   /  AST  59<H>  /  ALT  21  /  AlkPhos  80  -08    PT/INR - ( 2023 16:06 )   PT: 12.40 sec;   INR: 1.08 ratio         PTT - ( 2023 16:06 )  PTT:27.8 sec  Urinalysis Basic - ( 2023 17:53 )    Color: Yellow / Appearance: Slightly Turbid / S.019 / pH: x  Gluc: x / Ketone: Trace  / Bili: Negative / Urobili: 3 mg/dL   Blood: x / Protein: Trace / Nitrite: Negative   Leuk Esterase: Moderate / RBC: 17 /HPF / WBC 8 /HPF   Sq Epi: x / Non Sq Epi: 6 /HPF / Bacteria: Many            Culture - Urine (collected 2023 17:53)  Source: Clean Catch Clean Catch (Midstream)  Final Report (2023 09:11):    >=3 organisms. Probable collection contamination.    Culture - Blood (collected 2023 16:06)  Source: .Blood Blood-Peripheral  Preliminary Report (2023 23:01):    No growth to date.    Culture - Blood (collected 2023 16:06)  Source: .Blood Blood-Peripheral  Preliminary Report (2023 23:01):    No growth to date.      CARDIAC MARKERS ( 2023 16:06 )  x     / <0.01 ng/mL / x     / x     / x                PHYSICAL EXAM:  GEN: No acute distress  LUNGS: Clear to auscultation bilaterally   HEART: Regular  ABD: Soft, non-tender, non-distended.  EXT: no edema  NEURO: AAOX3

## 2023-01-08 NOTE — PROGRESS NOTE ADULT - ASSESSMENT
This is an 82-year-old patient with Hx of HTN, DM, DL, presents with lightheadedness that she felt when she was moving from the bathroom to her room causing her to fall on her knees. She did not lose consciousness or hit her head. She's been complaining of cough and fever for the past 3-4 days , + sick contact (her grandson tested COVID+). She has decreased PO intake for 2 days.      Plan:    #Fall- most likely orthostatic hypotension from dehydration  #Covid +  - positive on 1/6/2022, symptoms started on 1/2/2022  - started RDV  - saturating well on RA. no wheezing on exam. No steroids for now  - no signs of bacterial pneumonia. monitor off abx for now  - ID consult appreciated      #DM  #DL  - hold home pioglitazone, metformin, and trulicity  - started on SS. monitor FS (goal 140-180)  - start insulin regimen if needed  - f/u A1c and lipid profile  - cw statin      #HTN  - cw home losartan   - hold HCTZ for now      #MISC  - DVT PPx: Lovenox 40 qd.   - GI PPx: protonix 40 po daily  - Diet: DASH, Carbs restricted, low salt  - physical therapy  anticipate dc tomorrow This is an 82-year-old patient with Hx of HTN, DM, DL, presents with lightheadedness that she felt when she was moving from the bathroom to her room causing her to fall on her knees. She did not lose consciousness or hit her head. She's been complaining of cough and fever for the past 3-4 days , + sick contact (her grandson tested COVID+). She has decreased PO intake for 2 days.      Plan:    #Fall-   most likely orthostatic hypotension from dehydration  responded to iv fluid  f/u troponin  f/u echo  physical therapy      #Covid +  - positive on 1/6/2022, symptoms started on 1/2/2022  - started RDV  - saturating well on RA. no wheezing on exam. No steroids for now  - no signs of bacterial pneumonia. monitor off abx for now  - ID consult appreciated      #DM  #DL  - hold home pioglitazone, metformin, and trulicity  - started on SS. monitor FS (goal 140-180)  - start insulin regimen if needed  - f/u A1c and lipid profile  - cw statin      #HTN  - hold for now      #MISC  - DVT PPx: Lovenox 40 qd.   - GI PPx: protonix 40 po daily  - Diet: DASH, Carbs restricted, low salt  - physical therapy  anticipate dc tomorrow

## 2023-01-09 ENCOUNTER — TRANSCRIPTION ENCOUNTER (OUTPATIENT)
Age: 83
End: 2023-01-09

## 2023-01-09 LAB
ALBUMIN SERPL ELPH-MCNC: 3.6 G/DL — SIGNIFICANT CHANGE UP (ref 3.5–5.2)
ALP SERPL-CCNC: 79 U/L — SIGNIFICANT CHANGE UP (ref 30–115)
ALT FLD-CCNC: 22 U/L — SIGNIFICANT CHANGE UP (ref 0–41)
ANION GAP SERPL CALC-SCNC: 12 MMOL/L — SIGNIFICANT CHANGE UP (ref 7–14)
AST SERPL-CCNC: 50 U/L — HIGH (ref 0–41)
BASOPHILS # BLD AUTO: 0.01 K/UL — SIGNIFICANT CHANGE UP (ref 0–0.2)
BASOPHILS NFR BLD AUTO: 0.1 % — SIGNIFICANT CHANGE UP (ref 0–1)
BILIRUB SERPL-MCNC: 0.9 MG/DL — SIGNIFICANT CHANGE UP (ref 0.2–1.2)
BUN SERPL-MCNC: 22 MG/DL — HIGH (ref 10–20)
CALCIUM SERPL-MCNC: 9.5 MG/DL — SIGNIFICANT CHANGE UP (ref 8.4–10.5)
CHLORIDE SERPL-SCNC: 98 MMOL/L — SIGNIFICANT CHANGE UP (ref 98–110)
CO2 SERPL-SCNC: 30 MMOL/L — SIGNIFICANT CHANGE UP (ref 17–32)
CREAT SERPL-MCNC: 0.8 MG/DL — SIGNIFICANT CHANGE UP (ref 0.7–1.5)
EGFR: 74 ML/MIN/1.73M2 — SIGNIFICANT CHANGE UP
EOSINOPHIL # BLD AUTO: 0.02 K/UL — SIGNIFICANT CHANGE UP (ref 0–0.7)
EOSINOPHIL NFR BLD AUTO: 0.2 % — SIGNIFICANT CHANGE UP (ref 0–8)
GLUCOSE BLDC GLUCOMTR-MCNC: 131 MG/DL — HIGH (ref 70–99)
GLUCOSE BLDC GLUCOMTR-MCNC: 153 MG/DL — HIGH (ref 70–99)
GLUCOSE BLDC GLUCOMTR-MCNC: 158 MG/DL — HIGH (ref 70–99)
GLUCOSE BLDC GLUCOMTR-MCNC: 243 MG/DL — HIGH (ref 70–99)
GLUCOSE SERPL-MCNC: 135 MG/DL — HIGH (ref 70–99)
HCT VFR BLD CALC: 37.2 % — SIGNIFICANT CHANGE UP (ref 37–47)
HGB BLD-MCNC: 12.3 G/DL — SIGNIFICANT CHANGE UP (ref 12–16)
IMM GRANULOCYTES NFR BLD AUTO: 0.4 % — HIGH (ref 0.1–0.3)
LYMPHOCYTES # BLD AUTO: 0.94 K/UL — LOW (ref 1.2–3.4)
LYMPHOCYTES # BLD AUTO: 9.4 % — LOW (ref 20.5–51.1)
MAGNESIUM SERPL-MCNC: 1.5 MG/DL — LOW (ref 1.8–2.4)
MCHC RBC-ENTMCNC: 27.1 PG — SIGNIFICANT CHANGE UP (ref 27–31)
MCHC RBC-ENTMCNC: 33.1 G/DL — SIGNIFICANT CHANGE UP (ref 32–37)
MCV RBC AUTO: 81.9 FL — SIGNIFICANT CHANGE UP (ref 81–99)
MONOCYTES # BLD AUTO: 1.13 K/UL — HIGH (ref 0.1–0.6)
MONOCYTES NFR BLD AUTO: 11.3 % — HIGH (ref 1.7–9.3)
NEUTROPHILS # BLD AUTO: 7.9 K/UL — HIGH (ref 1.4–6.5)
NEUTROPHILS NFR BLD AUTO: 78.6 % — HIGH (ref 42.2–75.2)
NRBC # BLD: 0 /100 WBCS — SIGNIFICANT CHANGE UP (ref 0–0)
PLATELET # BLD AUTO: 207 K/UL — SIGNIFICANT CHANGE UP (ref 130–400)
POTASSIUM SERPL-MCNC: 3.7 MMOL/L — SIGNIFICANT CHANGE UP (ref 3.5–5)
POTASSIUM SERPL-SCNC: 3.7 MMOL/L — SIGNIFICANT CHANGE UP (ref 3.5–5)
PROT SERPL-MCNC: 6.3 G/DL — SIGNIFICANT CHANGE UP (ref 6–8)
RBC # BLD: 4.54 M/UL — SIGNIFICANT CHANGE UP (ref 4.2–5.4)
RBC # FLD: 13.8 % — SIGNIFICANT CHANGE UP (ref 11.5–14.5)
SODIUM SERPL-SCNC: 140 MMOL/L — SIGNIFICANT CHANGE UP (ref 135–146)
TROPONIN T SERPL-MCNC: <0.01 NG/ML — SIGNIFICANT CHANGE UP
WBC # BLD: 10.04 K/UL — SIGNIFICANT CHANGE UP (ref 4.8–10.8)
WBC # FLD AUTO: 10.04 K/UL — SIGNIFICANT CHANGE UP (ref 4.8–10.8)

## 2023-01-09 PROCEDURE — 73620 X-RAY EXAM OF FOOT: CPT | Mod: 26,LT

## 2023-01-09 PROCEDURE — 73562 X-RAY EXAM OF KNEE 3: CPT | Mod: 26,LT

## 2023-01-09 PROCEDURE — 99232 SBSQ HOSP IP/OBS MODERATE 35: CPT

## 2023-01-09 PROCEDURE — 93306 TTE W/DOPPLER COMPLETE: CPT | Mod: 26

## 2023-01-09 RX ORDER — ACETAMINOPHEN 500 MG
2 TABLET ORAL
Qty: 240 | Refills: 0
Start: 2023-01-09 | End: 2023-02-07

## 2023-01-09 RX ORDER — MAGNESIUM OXIDE 400 MG ORAL TABLET 241.3 MG
400 TABLET ORAL EVERY 4 HOURS
Refills: 0 | Status: COMPLETED | OUTPATIENT
Start: 2023-01-09 | End: 2023-01-09

## 2023-01-09 RX ADMIN — PANTOPRAZOLE SODIUM 40 MILLIGRAM(S): 20 TABLET, DELAYED RELEASE ORAL at 08:13

## 2023-01-09 RX ADMIN — Medication 650 MILLIGRAM(S): at 23:23

## 2023-01-09 RX ADMIN — Medication 81 MILLIGRAM(S): at 13:52

## 2023-01-09 RX ADMIN — MAGNESIUM OXIDE 400 MG ORAL TABLET 400 MILLIGRAM(S): 241.3 TABLET ORAL at 20:56

## 2023-01-09 RX ADMIN — Medication 5 MILLIGRAM(S): at 20:56

## 2023-01-09 RX ADMIN — ATORVASTATIN CALCIUM 20 MILLIGRAM(S): 80 TABLET, FILM COATED ORAL at 21:58

## 2023-01-09 RX ADMIN — MAGNESIUM OXIDE 400 MG ORAL TABLET 400 MILLIGRAM(S): 241.3 TABLET ORAL at 13:52

## 2023-01-09 RX ADMIN — Medication 650 MILLIGRAM(S): at 20:56

## 2023-01-09 RX ADMIN — REMDESIVIR 200 MILLIGRAM(S): 5 INJECTION INTRAVENOUS at 05:11

## 2023-01-09 RX ADMIN — Medication 5 MILLIGRAM(S): at 05:11

## 2023-01-09 RX ADMIN — Medication 650 MILLIGRAM(S): at 08:02

## 2023-01-09 NOTE — ED ADULT NURSE REASSESSMENT NOTE - NS ED NURSE REASSESS COMMENT FT1
MD Callum Sandoval was called and made aware of pt's Right arm, redness, warmth and hardness. Md will see pt.

## 2023-01-09 NOTE — PROGRESS NOTE ADULT - SUBJECTIVE AND OBJECTIVE BOX
MATTYPRABHJOTSAUD  82y, Female  Allergy: No Known Allergies    Hospital Day: 3d    Patient seen and examined. No acute events overnight    PMH/PSH:  PAST MEDICAL & SURGICAL HISTORY:      VITALS:  T(F): 96.9 (01-09-23 @ 07:26), Max: 98.4 (01-08-23 @ 16:28)  HR: 78 (01-09-23 @ 07:26)  BP: 111/56 (01-09-23 @ 07:26) (103/56 - 118/56)  RR: 18 (01-09-23 @ 07:26)  SpO2: 97% (01-09-23 @ 07:26)    TESTS & MEASUREMENTS:  Weight (Kg):                             12.3   10.04 )-----------( 207      ( 09 Jan 2023 07:08 )             37.2       01-09    140  |  98  |  22<H>  ----------------------------<  135<H>  3.7   |  30  |  0.8    Ca    9.5      09 Jan 2023 07:08  Mg     1.5     01-09    TPro  6.3  /  Alb  3.6  /  TBili  0.9  /  DBili  x   /  AST  50<H>  /  ALT  22  /  AlkPhos  79  01-09    LIVER FUNCTIONS - ( 09 Jan 2023 07:08 )  Alb: 3.6 g/dL / Pro: 6.3 g/dL / ALK PHOS: 79 U/L / ALT: 22 U/L / AST: 50 U/L / GGT: x           CARDIAC MARKERS ( 09 Jan 2023 07:08 )  x     / <0.01 ng/mL / x     / x     / x            Culture - Urine (collected 01-06-23 @ 17:53)  Source: Clean Catch Clean Catch (Midstream)  Final Report (01-08-23 @ 09:11):    >=3 organisms. Probable collection contamination.    Culture - Blood (collected 01-06-23 @ 16:06)  Source: .Blood Blood-Peripheral  Preliminary Report (01-07-23 @ 23:01):    No growth to date.    Culture - Blood (collected 01-06-23 @ 16:06)  Source: .Blood Blood-Peripheral  Preliminary Report (01-07-23 @ 23:01):    No growth to date.          RADIOLOGY & ADDITIONAL TESTS:    RECENT DIAGNOSTIC ORDERS:  Xray Foot AP + Lateral, Left: Routine   Indication: left foot  Transport: [facility]  Provider's Contact #: [phone] (01-09-23 @ 14:26)      MEDICATIONS:  MEDICATIONS  (STANDING):  aspirin enteric coated 81 milliGRAM(s) Oral daily  atorvastatin 20 milliGRAM(s) Oral at bedtime  dextrose 5%. 1000 milliLiter(s) (100 mL/Hr) IV Continuous <Continuous>  dextrose 5%. 1000 milliLiter(s) (50 mL/Hr) IV Continuous <Continuous>  dextrose 50% Injectable 25 Gram(s) IV Push once  dextrose 50% Injectable 12.5 Gram(s) IV Push once  dextrose 50% Injectable 25 Gram(s) IV Push once  enoxaparin Injectable 40 milliGRAM(s) SubCutaneous every 24 hours  glucagon  Injectable 1 milliGRAM(s) IntraMuscular once  insulin lispro (ADMELOG) corrective regimen sliding scale   SubCutaneous Before meals and at bedtime  oxybutynin 5 milliGRAM(s) Oral two times a day  pantoprazole    Tablet 40 milliGRAM(s) Oral before breakfast  remdesivir  IVPB   IV Intermittent   remdesivir  IVPB 100 milliGRAM(s) IV Intermittent every 24 hours    MEDICATIONS  (PRN):  acetaminophen     Tablet .. 650 milliGRAM(s) Oral every 6 hours PRN Temp greater or equal to 38C (100.4F), Mild Pain (1 - 3)  dextrose Oral Gel 15 Gram(s) Oral once PRN Blood Glucose LESS THAN 70 milliGRAM(s)/deciliter      HOME MEDICATIONS:  aspirin 81 mg oral delayed release tablet (01-07)  atorvastatin 20 mg oral tablet (01-07)  losartan-hydrochlorothiazide 50 mg-12.5 mg oral tablet (01-07)  metFORMIN 500 mg oral tablet, extended release (01-07)  oxybutynin 5 mg oral tablet (01-07)  pioglitazone 30 mg oral tablet (01-07)  Trulicity Pen 0.75 mg/0.5 mL subcutaneous solution (01-07)      REVIEW OF SYSTEMS:  All other review of systems is negative unless indicated above.     PHYSICAL EXAM:  PHYSICAL EXAM:  GENERAL: NAD, well-developed  HEAD:  Atraumatic, Normocephalic  NECK: Supple, No JVD  CHEST/LUNG: Clear to auscultation bilaterally; No wheeze  HEART: Regular rate and rhythm; No murmurs, rubs, or gallops  ABDOMEN: Soft, Nontender, Nondistended; Bowel sounds present  EXTREMITIES:  2+ Peripheral Pulses, No clubbing, cyanosis, or edema; Left knee TTP  SKIN: No rashes or lesions

## 2023-01-09 NOTE — DISCHARGE NOTE PROVIDER - NSDCMRMEDTOKEN_GEN_ALL_CORE_FT
aspirin 81 mg oral delayed release tablet: 1 tab(s) orally once a day  atorvastatin 20 mg oral tablet: 1 tab(s) orally once a day  losartan-hydrochlorothiazide 50 mg-12.5 mg oral tablet: 1 tab(s) orally once a day  metFORMIN 500 mg oral tablet, extended release: 1 tab(s) orally once a day  oxybutynin 5 mg oral tablet: 1 tab(s) orally 2 times a day  pioglitazone 30 mg oral tablet: 1 tab(s) orally once a day  Trulicity Pen 0.75 mg/0.5 mL subcutaneous solution: 1 application subcutaneous once a week   aspirin 81 mg oral delayed release tablet: 1 tab(s) orally once a day  atorvastatin 20 mg oral tablet: 1 tab(s) orally once a day  metFORMIN 500 mg oral tablet, extended release: 1 tab(s) orally once a day  oxybutynin 5 mg oral tablet: 1 tab(s) orally 2 times a day  Trulicity Pen 0.75 mg/0.5 mL subcutaneous solution: 1 application subcutaneous once a week

## 2023-01-09 NOTE — CONSULT NOTE ADULT - SUBJECTIVE AND OBJECTIVE BOX
SAUD VILLAVICENCIO  82y, Female  Allergy: No Known Allergies      All historical available data reviewed.    HPI:  This is an 82-year-old patient with Hx of HTN, DM, DL, presents with lightheadedness that she felt when she was moving from the bathroom to her room causing her to fall on her knees. She did not lose consciousness or hit her head. She's been complaining of cough and fever for the past 3-4 days , + sick contact (her grandson tested COVID+). She has decreased PO intake for 2 days.  Patient lives alone, ambulates independently but has a cane and a walker PRN. She denies SOB, chest pain, N/V/D, urinary sx or abnormal BMs.    In the ED vitals were stable. normal O2 sat on RA.  Patient was found to be COVID+. Labs notable for T bili 1.5. otherwise normal. normal trops.  EKG: NSR with PACs.  Xray pelvis negative  CXR pending read  CTH initially showed possible hemorrhage or mass lesion --> repeat head CT was negative    Patient is admitted to [facility] for Covid 19 and fall.   (2023 03:46)    FAMILY HISTORY:    PAST MEDICAL & SURGICAL HISTORY:        VITALS:  T(F): 98.3, Max: 100.5 (23 @ 00:02)  HR: 81  BP: 122/60  RR: 19Vital Signs Last 24 Hrs  T(C): 36.8 (2023 08:00), Max: 38.1 (2023 00:02)  T(F): 98.3 (2023 08:00), Max: 100.5 (2023 00:02)  HR: 81 (2023 08:00) (79 - 88)  BP: 122/60 (2023 08:00) (122/58 - 126/58)  BP(mean): --  RR: 19 (2023 08:00) (18 - 20)  SpO2: 97% (2023 08:00) (96% - 97%)    Parameters below as of 2023 08:00  Patient On (Oxygen Delivery Method): room air        TESTS & MEASUREMENTS:                        13.2   10.47 )-----------( 227      ( 2023 16:06 )             41.1     -    138  |  99  |  20  ----------------------------<  105<H>  4.2   |  27  |  0.9    Ca    9.4      2023 16:06    TPro  6.5  /  Alb  3.7  /  TBili  1.4<H>  /  DBili  x   /  AST  35  /  ALT  13  /  AlkPhos  96  -    LIVER FUNCTIONS - ( 2023 16:06 )  Alb: 3.7 g/dL / Pro: 6.5 g/dL / ALK PHOS: 96 U/L / ALT: 13 U/L / AST: 35 U/L / GGT: x             Urinalysis Basic - ( 2023 17:53 )    Color: Yellow / Appearance: Slightly Turbid / S.019 / pH: x  Gluc: x / Ketone: Trace  / Bili: Negative / Urobili: 3 mg/dL   Blood: x / Protein: Trace / Nitrite: Negative   Leuk Esterase: Moderate / RBC: 17 /HPF / WBC 8 /HPF   Sq Epi: x / Non Sq Epi: 6 /HPF / Bacteria: Many          RADIOLOGY & ADDITIONAL TESTS:  Personal review of radiological diagnostics performed  Echo and EKG results noted when applicable.     MEDICATIONS:  acetaminophen     Tablet .. 650 milliGRAM(s) Oral every 6 hours PRN  aspirin enteric coated 81 milliGRAM(s) Oral daily  atorvastatin 20 milliGRAM(s) Oral at bedtime  dextrose 5%. 1000 milliLiter(s) IV Continuous <Continuous>  dextrose 5%. 1000 milliLiter(s) IV Continuous <Continuous>  dextrose 50% Injectable 25 Gram(s) IV Push once  dextrose 50% Injectable 12.5 Gram(s) IV Push once  dextrose 50% Injectable 25 Gram(s) IV Push once  dextrose Oral Gel 15 Gram(s) Oral once PRN  enoxaparin Injectable 40 milliGRAM(s) SubCutaneous every 24 hours  glucagon  Injectable 1 milliGRAM(s) IntraMuscular once  hydrochlorothiazide 12.5 milliGRAM(s) Oral daily  insulin lispro (ADMELOG) corrective regimen sliding scale   SubCutaneous Before meals and at bedtime  losartan 50 milliGRAM(s) Oral daily  oxybutynin 5 milliGRAM(s) Oral two times a day  pantoprazole    Tablet 40 milliGRAM(s) Oral before breakfast  remdesivir  IVPB   IV Intermittent       ANTIBIOTICS:  remdesivir  IVPB   IV Intermittent     
Orthopaedic Surgery Consult Note    For Surgeon:    HPI:  82yFemale  Patient is a 82y old  Female who presents with a chief complaint of Fall s/p lightheadedness (09 Jan 2023 11:09)    HPI:  This is an 82-year-old patient with Hx of HTN, DM, DL, presents with lightheadedness that she felt when she was moving from the bathroom to her room causing her to fall on her knees. She did not lose consciousness or hit her head. She's been complaining of cough and fever for the past 3-4 days , + sick contact (her grandson tested COVID+). She has decreased PO intake for 2 days.  Patient lives alone, ambulates independently but has a cane and a walker PRN. She denies SOB, chest pain, N/V/D, urinary sx or abnormal BMs.  pt seen in ed.   pt started complaining of left knee pain s/p fall.  pt also has a left foot pain.   pt denies head trauma and any other pain.   pt is a independent ambulator       Allergies    No Known Allergies    Intolerances      PAST MEDICAL & SURGICAL HISTORY:    MEDICATIONS  (STANDING):  aspirin enteric coated 81 milliGRAM(s) Oral daily  atorvastatin 20 milliGRAM(s) Oral at bedtime  dextrose 5%. 1000 milliLiter(s) (100 mL/Hr) IV Continuous <Continuous>  dextrose 5%. 1000 milliLiter(s) (50 mL/Hr) IV Continuous <Continuous>  dextrose 50% Injectable 25 Gram(s) IV Push once  dextrose 50% Injectable 12.5 Gram(s) IV Push once  dextrose 50% Injectable 25 Gram(s) IV Push once  enoxaparin Injectable 40 milliGRAM(s) SubCutaneous every 24 hours  glucagon  Injectable 1 milliGRAM(s) IntraMuscular once  insulin lispro (ADMELOG) corrective regimen sliding scale   SubCutaneous Before meals and at bedtime  oxybutynin 5 milliGRAM(s) Oral two times a day  pantoprazole    Tablet 40 milliGRAM(s) Oral before breakfast  remdesivir  IVPB   IV Intermittent   remdesivir  IVPB 100 milliGRAM(s) IV Intermittent every 24 hours    MEDICATIONS  (PRN):  acetaminophen     Tablet .. 650 milliGRAM(s) Oral every 6 hours PRN Temp greater or equal to 38C (100.4F), Mild Pain (1 - 3)  dextrose Oral Gel 15 Gram(s) Oral once PRN Blood Glucose LESS THAN 70 milliGRAM(s)/deciliter      Vital Signs Last 24 Hrs  T(C): 36.1 (09 Jan 2023 07:26), Max: 36.9 (08 Jan 2023 16:28)  T(F): 96.9 (09 Jan 2023 07:26), Max: 98.4 (08 Jan 2023 16:28)  HR: 78 (09 Jan 2023 07:26) (68 - 78)  BP: 111/56 (09 Jan 2023 07:26) (103/56 - 118/56)  BP(mean): --  RR: 18 (09 Jan 2023 07:26) (16 - 18)  SpO2: 97% (09 Jan 2023 07:26) (97% - 98%)    Parameters below as of 09 Jan 2023 07:26  Patient On (Oxygen Delivery Method): room air        Physical Exam:  left knee:   skin intact,   effusion present, no ecchymosis present, global pain about the knee,     can straight leg raise and extend knee with support behind the posterior thigh.    no palpable defect noted,    no pain with log roll,   no pain at ankle,    pain with palpation at mid foot, no ecchymosis or swelling about the foot,    nvid                         12.3   10.04 )-----------( 207      ( 09 Jan 2023 07:08 )             37.2     01-09    140  |  98  |  22<H>  ----------------------------<  135<H>  3.7   |  30  |  0.8    Ca    9.5      09 Jan 2023 07:08  Mg     1.5     01-09    TPro  6.3  /  Alb  3.6  /  TBili  0.9  /  DBili  x   /  AST  50<H>  /  ALT  22  /  AlkPhos  79  01-09      Imaging: left knee xray shows left knee oa,   effusion present no fracture seen     A/P: 82yFemale    -Discussed with

## 2023-01-09 NOTE — PROGRESS NOTE ADULT - ASSESSMENT
82-year-old patient with Hx of HTN, DM, DL, presents with lightheadedness that she felt when she was moving from the bathroom to her room causing her to fall on her knees. She did not lose consciousness or hit her head. She's been complaining of cough and fever for the past 3-4 days , + sick contact (her grandson tested COVID+). She has decreased PO intake for 2 days.    #Fall  #Left knee effusion likely due to osteoarthritis + trauma  XR knee, left (01/09: Large knee joint effusion; No fractures  Echo 01/09: 60-65% EF  most likely orthostatic hypotension from dehydration  responded to iv fluid  Pt was unable to ambulate with PT today due to severe left knee pain  sp Ortho eval - recommend cold compress therapy, XR left foot, WBAT  - Pending PT re-eval tomorrow for disposition  - f/u XR left foot   - DC telemetry    #Covid +  positive on 1/6/2022, symptoms started on 1/2/2022  sp RDV x3d  - saturating well on RA. no wheezing on exam. No steroids for now  - no signs of bacterial pneumonia. monitor off abx for now  - ID consult appreciated    #DM  #DL  - hold home pioglitazone, metformin, and trulicity  - Cont ISS. monitor FS (goal 140-180)  - f/u A1c and lipid profile  - cw statin    #HTN  - hold for now    DVT PPX, Lovenox    #Progress Note Handoff  Pending (specify): PT re-eval, pain control  Family discussion: d/w pt regarding dc planning   Disposition: [facility]

## 2023-01-09 NOTE — ED ADULT NURSE REASSESSMENT NOTE - NS ED NURSE REASSESS COMMENT FT1
Pt got tired of the monitor beeping and disconnected her self from the monitor. Stated " I cant do this anymore with this monitor".

## 2023-01-09 NOTE — DISCHARGE NOTE PROVIDER - HOSPITAL COURSE
This is an 82-year-old patient with Hx of HTN, DM, DL, presents with lightheadedness that she felt when she was moving from the bathroom to her room causing her to fall on her knees. She did not lose consciousness or hit her head. She's been complaining of cough and fever for the past 3-4 days , + sick contact (her grandson tested COVID+). She has decreased PO intake for 2 days.  Patient lives alone, ambulates independently but has a cane and a walker PRN. She denies SOB, chest pain, N/V/D, urinary sx or abnormal BMs.  In the ED vitals were stable. normal O2 sat on RA. Patient was found to be COVID+. Labs notable for T bili 1.5. otherwise normal. normal trops.  EKG: NSR with PACs.   Xray pelvis negative, CXR WNL, CTH initially showed possible hemorrhage or mass lesion --> repeat head CT was negative  Patient is admitted to [facility] for Covid 19 and fall.    Hospital course:   RDV x3days for COVID.     #Fall- most likely orthostatic hypotension from dehydration  #Covid +  - positive on 1/6/2022, symptoms started on 1/2/2022  - started RDV, x3d as per ID   - saturating well on RA. no wheezing on exam. No steroids for now  - no signs of bacterial pneumonia. monitor off abx for now  - procal 0.26, ESR 71, ferritin 176, dimer 1071   - monitor tele for any arrythmias. TSH 2.60   - ProBNP 1267  - UA positive but patient asymptomatic   - f/u orthostatics. if positive--> IVF       #DM  #DL  - hold home pioglitazone, metformin, and trulicity  - started on SS. monitor FS (goal 140-180)  - start insulin regimen if needed  - f/u A1c and lipid profile  - cw statin      #HTN  - cw home losartan and HCT This is an 82-year-old patient with Hx of HTN, DM, DL, presents with lightheadedness that she felt when she was moving from the bathroom to her room causing her to fall on her knees. She did not lose consciousness or hit her head. She's been complaining of cough and fever for the past 3-4 days , + sick contact (her grandson tested COVID+). She has decreased PO intake for 2 days.  Patient lives alone, ambulates independently but has a cane and a walker PRN. She denies SOB, chest pain, N/V/D, urinary sx or abnormal BMs.  In the ED vitals were stable. normal O2 sat on RA. Patient was found to be COVID+. Labs notable for T bili 1.5. otherwise normal. normal trops.  EKG: NSR with PACs.   Xray pelvis negative, CXR WNL, CTH initially showed possible hemorrhage or mass lesion --> repeat head CT was negative  Patient is admitted to [facility] for Covid 19 and fall.    Hospital course:   RDV x3days for COVID.     #Fall- most likely orthostatic hypotension from dehydration  #Covid +  - positive on 1/6/2022, symptoms started on 1/2/2022  - started RDV, x3d as per ID   - saturating well on RA. no wheezing on exam. No steroids for now  - no signs of bacterial pneumonia. monitor off abx for now  - procal 0.26, ESR 71, ferritin 176, dimer 1071   - monitor tele for any arrythmias. TSH 2.60   - ProBNP 1267  - UA positive but patient asymptomatic   - f/u orthostatics. if positive--> IVF     #L knee pain   (+) TTP where pt fell  - XR L knee 1/9/23 pending    #DM  #DL  - hold home pioglitazone, metformin, and trulicity  - started on SS. monitor FS (goal 140-180)  - start insulin regimen if needed  - f/u A1c and lipid profile  - cw statin      #HTN  - cw home losartan and HCT This is an 82-year-old patient with Hx of HTN, DM, DL, presents with lightheadedness that she felt when she was moving from the bathroom to her room causing her to fall on her knees. She did not lose consciousness or hit her head. She's been complaining of cough and fever for the past 3-4 days , + sick contact (her grandson tested COVID+). She has decreased PO intake for 2 days.  Patient lives alone, ambulates independently but has a cane and a walker PRN. She denies SOB, chest pain, N/V/D, urinary sx or abnormal BMs.  In the ED vitals were stable. normal O2 sat on RA. Patient was found to be COVID+. Labs notable for T bili 1.5. otherwise normal. normal trops.  EKG: NSR with PACs.   Xray pelvis negative, CXR WNL, CTH initially showed possible hemorrhage or mass lesion --> repeat head CT was negative  Patient is admitted to [facility] for Covid 19 and fall.    Hospital course:   RDV x3days for COVID. Echo EF 60-65%, G1DD, mild MR. L knee XR noted effusion, seen by Ortho w recs for     #Fall- most likely orthostatic hypotension from dehydration  #Covid +  - positive on 1/6/2022, symptoms started on 1/2/2022  - started RDV, x3d as per ID   - saturating well on RA. no wheezing on exam. No steroids for now  - no signs of bacterial pneumonia. monitor off abx for now  - procal 0.26, ESR 71, ferritin 176, dimer 1071   - monitor tele for any arrythmias. TSH 2.60   - ProBNP 1267  - UA positive but patient asymptomatic   - f/u orthostatics. if positive--> IVF     #L knee pain   (+) TTP where pt fell  - XR L knee 1/9/23 pending  - Ortho:   - pain control   - wbat with a walker   - pt eval   - left foot xray due to pain   - large bag of ice to knee 20minutes on 20 minutes off over a towel   - follow up with dr kaur as a outpatient upon discharge [address] [phone]      #DM  #DL  - hold home pioglitazone, metformin, and trulicity  - started on SS. monitor FS (goal 140-180)  - start insulin regimen if needed  - f/u A1c and lipid profile  - cw statin      #HTN  - cw home losartan and HCT This is an 82-year-old patient with Hx of HTN, DM, DL, presents with lightheadedness that she felt when she was moving from the bathroom to her room causing her to fall on her knees. She did not lose consciousness or hit her head. She's been complaining of cough and fever for the past 3-4 days , + sick contact (her grandson tested COVID+). She has decreased PO intake for 2 days.  Patient lives alone, ambulates independently but has a cane and a walker PRN. She denies SOB, chest pain, N/V/D, urinary sx or abnormal BMs.  In the ED vitals were stable. normal O2 sat on RA. Patient was found to be COVID+. Labs notable for T bili 1.5. otherwise normal. normal trops.  EKG: NSR with PACs.   Xray pelvis negative, CXR WNL, CTH initially showed possible hemorrhage or mass lesion --> repeat head CT was negative  Patient is admitted to [facility] for Covid 19 and fall.    Hospital course:   RDV x3days for COVID, remained asymptomatic. Echo EF 60-65%, G1DD, mild MR. L knee XR noted effusion, seen by Ortho recs weight bearing as tolerated, cold compress. PT evaluated patient and rec o/p rehab.    Patient is medically stable for discharge to short-term rehab facility with outpatient follow-up with Cardiology and Orthopedic Surgery.    #Fall  #Left knee effusion likely due to osteoarthritis + trauma  - XR knee, left (01/09): Large knee joint effusion; No fractures  - XR Foot 01/09: No acute fractures; diffuse soft tissue swelling; DJD  - Echo 01/09: 60-65% EF  - most likely orthostatic hypotension from dehydration, responded to iv fluids  - Pt was unable to ambulate with PT yesterday due to severe left knee pain  - s/p Ortho eval - recommend cold compress therapy, WBAT  - Per PT 1/10: Ambulation limited by L knee pain. Pt felt faint when standing so couldn't complete assessment. 8/10 pain when bearing weight to left knee. She does not have pain at rest.  - Cont PT  - Pain control  - Cont cold compress therapy    #Fevers resolved  #Suspected thrombophlebitis, right arm PIV site  now stable, no need for cont of abx     #Covid+  - Positive test 1/6/2022, symptoms started on 1/2/2022  - S/p RDV x3d  - saturating well on RA. no wheezing on exam. No steroids for now  - no signs of bacterial pneumonia  - ID consult appreciated    #DM  POCT Blood Glucose.: 150 mg/dL (01-13-23 @ 12:00)  POCT Blood Glucose.: 87 mg/dL (01-13-23 @ 07:46)  POCT Blood Glucose.: 110 mg/dL (01-12-23 @ 22:01)  POCT Blood Glucose.: 111 mg/dL (01-12-23 @ 17:02)  cont insulin per protocol while inpt   change back to home meds on dc     - lipid profile WNL (except HDL: 37)  - Hold home pioglitazone, metformin, and trulicity  - Cont ISS. monitor FS (goal 140-180)  - cw Lipitor 20mg qHS    #HTN, DL   no need for bp meds, cont statin   HR: 56 (01-13-23 @ 10:59) (56 - 73)  BP: 112/56 (01-13-23 @ 05:00) (110/58 - 114/69)

## 2023-01-09 NOTE — DISCHARGE NOTE PROVIDER - CARE PROVIDER_API CALL
Phani Agustin)  Family Medicine  [address] [facility] [address], [address]  [place] [address]  Phone: [phone]  Fax: [phone]  Follow Up Time:     Cornelius Lan  CARDIOVASCULAR DISEASE  [address]  [place] [address]  Phone: [phone]  Fax: [phone]  Follow Up Time:    Phani Agustin)  Family Medicine  [address] [facility], [address]  [place] [address]  Phone: [phone]  Fax: [phone]  Follow Up Time:     Cornelius Lan  CARDIOVASCULAR DISEASE  [address]  [place] [address]  Phone: [phone]  Fax: [phone]  Follow Up Time:     Sravan Iverson)  Orthopaedic Surgery  [address] [place] [address]  Phone: [phone]  Fax: [phone]  Follow Up Time:

## 2023-01-09 NOTE — CONSULT NOTE ADULT - ASSESSMENT
left knee pain s/p fall.   no disruption of extensor mechanism found on exam.  Likely arthritis exacerbation mixed with contusion     pain control   wbat with a walker   pt eval   left foot xray due to pain   large bag of ice to knee 20minutes on 20 minutes off over a towel   follow up with dr kaur as a outpatient upon discharge [address] [facility] [phone]

## 2023-01-09 NOTE — DISCHARGE NOTE PROVIDER - NSDCCPCAREPLAN_GEN_ALL_CORE_FT
PRINCIPAL DISCHARGE DIAGNOSIS  Diagnosis: Fall  Assessment and Plan of Treatment: You were evaluated in the hospital for your fall. It was likely due to weakness and dehydration in the setting of having COVID. While you were admitted you had a CAT scan of your head which was initially concerning for a potential mass, but the repeated CAT scan did not show this. You were also treated for COVID with Remdesivir. You did have left knee pain as well, for which you had an X ray.   After discharge, you will need to:   - Follow up with your primary care doctor within 1-2 weeks  - Follow up with your Cardiologist  - Take all the medications you were discharged with, unless otherwise instructed by your healthcare provider(s).   Please follow up with your providers by calling them to make an appointment so that you can see them in 1-2weeks; bring your paperwork from this hospital stay to that visit. You can access your visit information by signing up for an account for the patient portal at [email or web address]   Seek immediate medical attention if you develop fevers, chills, chest pain, shortness of breath, nausea and vomiting, abdominal pain, passing out, weakness or numbness or tingling on one side of your body, or any other concerning signs or symptoms.      SECONDARY DISCHARGE DIAGNOSES  Diagnosis: Lightheadedness  Assessment and Plan of Treatment:     Diagnosis: COVID-19  Assessment and Plan of Treatment:      PRINCIPAL DISCHARGE DIAGNOSIS  Diagnosis: Fall  Assessment and Plan of Treatment: You were evaluated in the hospital for your fall. It was likely due to weakness and dehydration in the setting of having COVID. While you were admitted you had a CAT scan of your head which was initially concerning for a potential mass, but the repeated CAT scan did not show this. You were also treated for COVID with Remdesivir. You did have left knee pain as well, for which you had an X ray.   After discharge, you will need to:   - Follow up with your primary care doctor within 1-2 weeks  - Follow up with your Cardiologist  - Follow up with Orthopedics Dr Iverson   - Take all the medications you were discharged with, unless otherwise instructed by your healthcare provider(s).   Please follow up with your providers by calling them to make an appointment so that you can see them in 1-2weeks; bring your paperwork from this hospital stay to that visit. You can access your visit information by signing up for an account for the patient portal at [email or web address]   Seek immediate medical attention if you develop fevers, chills, chest pain, shortness of breath, nausea and vomiting, abdominal pain, passing out, weakness or numbness or tingling on one side of your body, or any other concerning signs or symptoms.      SECONDARY DISCHARGE DIAGNOSES  Diagnosis: Lightheadedness  Assessment and Plan of Treatment:     Diagnosis: COVID-19  Assessment and Plan of Treatment:      PRINCIPAL DISCHARGE DIAGNOSIS  Diagnosis: Fall  Assessment and Plan of Treatment: You were evaluated in the hospital for your fall. It was likely due to weakness and dehydration in the setting of having COVID. While you were admitted you had a CAT scan of your head which was initially concerning for a potential mass, but the repeated CAT scan did not show this. You were also treated for COVID with Remdesivir. You did have left knee pain as well, for which you had an X ray.   After discharge, you will need to:   - Follow up with your primary care doctor within 1-2 weeks  - Follow up with your Cardiologist  - Follow up with Orthopedics Dr Iverson for your left knee pain. Use a large bag of ice to knee: 20minutes on 20 minutes off over a towel   - Take all the medications you were discharged with, unless otherwise instructed by your healthcare provider(s).   Please follow up with your providers by calling them to make an appointment so that you can see them in 1-2weeks; bring your paperwork from this hospital stay to that visit. You can access your visit information by signing up for an account for the patient portal at [email or web address]   Seek immediate medical attention if you develop fevers, chills, chest pain, shortness of breath, nausea and vomiting, abdominal pain, passing out, weakness or numbness or tingling on one side of your body, or any other concerning signs or symptoms.      SECONDARY DISCHARGE DIAGNOSES  Diagnosis: Lightheadedness  Assessment and Plan of Treatment:     Diagnosis: COVID-19  Assessment and Plan of Treatment:

## 2023-01-09 NOTE — DISCHARGE NOTE PROVIDER - DISCHARGE SERVICE FOR PATIENT
on the discharge service for the patient. I have reviewed and made amendments to the documentation where necessary. normal

## 2023-01-09 NOTE — DISCHARGE NOTE PROVIDER - NSDCFUADDINST_GEN_ALL_CORE_FT
You were evaluated in the hospital for your fall. It was likely due to weakness and dehydration in the setting of having COVID. While you were admitted you had a CAT scan of your head which was initially concerning for a potential mass, but the repeated CAT scan did not show this. You were also treated for COVID with Remdesivir. You did have left knee pain as well, for which you had an X ray.   After discharge, you will need to:  - Follow up with your primary care doctor within 1-2 weeks - Follow up with your Cardiologist - Take all the medications you were discharged with, unless otherwise instructed by your healthcare provider(s).   Please follow up with your providers by calling them to make an appointment so that you can see them in 1-2weeks; bring your paperwork from this hospital stay to that visit. You can access your visit information by signing up for an account for the patient portal at [email or web address]   Seek immediate medical attention if you develop fevers, chills, chest pain, shortness of breath, nausea and vomiting, abdominal pain, passing out, weakness or numbness or tingling on one side of your body, or any other concerning signs or symptoms.  You were evaluated in the hospital for your fall. It was likely due to weakness and dehydration in the setting of having COVID. While you were admitted you had a CAT scan of your head which was initially concerning for a potential mass, but the repeated CAT scan did not show this. You were also treated for COVID with Remdesivir. You did have left knee pain as well, for which you had an X ray.   After discharge, you will need to:  - Follow up with your primary care doctor within 1-2 weeks - Follow up with your Cardiologist - Follow up with Orthopedics Dr Iverson  - Take all the medications you were discharged with, unless otherwise instructed by your healthcare provider(s).   Please follow up with your providers by calling them to make an appointment so that you can see them in 1-2weeks; bring your paperwork from this hospital stay to that visit. You can access your visit information by signing up for an account for the patient portal at [email or web address]   Seek immediate medical attention if you develop fevers, chills, chest pain, shortness of breath, nausea and vomiting, abdominal pain, passing out, weakness or numbness or tingling on one side of your body, or any other concerning signs or symptoms. You were evaluated in the hospital for your fall. It was likely due to weakness and dehydration in the setting of having COVID. While you were admitted you had a CAT scan of your head which was initially concerning for a potential mass, but the repeated CAT scan did not show this. You were also treated for COVID with Remdesivir. You did have left knee pain as well, for which you had an X ray.   After discharge, you will need to:  - Follow up with your primary care doctor within 1-2 weeks - Follow up with your Cardiologist - Follow up with Orthopedics Dr Iverson for your left knee pain. Use a large bag of ice to knee: 20minutes on 20 minutes off over a towel  - Take all the medications you were discharged with, unless otherwise instructed by your healthcare provider(s).   Please follow up with your providers by calling them to make an appointment so that you can see them in 1-2weeks; bring your paperwork from this hospital stay to that visit. You can access your visit information by signing up for an account for the patient portal at [email or web address]   Seek immediate medical attention if you develop fevers, chills, chest pain, shortness of breath, nausea and vomiting, abdominal pain, passing out, weakness or numbness or tingling on one side of your body, or any other concerning signs or symptoms.

## 2023-01-09 NOTE — DISCHARGE NOTE PROVIDER - CARE PROVIDERS DIRECT ADDRESSES
,[email or web address]

## 2023-01-09 NOTE — ED ADULT NURSE REASSESSMENT NOTE - NS ED NURSE REASSESS COMMENT FT1
Rec'd pt from Ama Rodriguez for continuation of care. Pt is awake, alert and oriented to self and place. No acute respiratory distress noted. Pt remains in cardiac monitor. Pt was noted to have swollen and warm left knee.

## 2023-01-09 NOTE — DISCHARGE NOTE PROVIDER - PROVIDER TOKENS
PROVIDER:[TOKEN:[[id number]]],PROVIDER:[TOKEN:[[id number]]] PROVIDER:[TOKEN:[[id number]]],PROVIDER:[TOKEN:[[id number]]],PROVIDER:[TOKEN:[[id number]]]

## 2023-01-09 NOTE — DISCHARGE NOTE PROVIDER - NS AS DC PROVIDER CONTACT Y/N MULTI
Fax received from [address] [place] [address] product is on backorder  Please send two new prescriptions   One for Losartan 100 mg   and one for HCTZ 12.5 mg 1 QD    In purple folder Yes

## 2023-01-10 LAB
ALBUMIN SERPL ELPH-MCNC: 3.4 G/DL — LOW (ref 3.5–5.2)
ALP SERPL-CCNC: 75 U/L — SIGNIFICANT CHANGE UP (ref 30–115)
ALT FLD-CCNC: 21 U/L — SIGNIFICANT CHANGE UP (ref 0–41)
ANION GAP SERPL CALC-SCNC: 16 MMOL/L — HIGH (ref 7–14)
AST SERPL-CCNC: 39 U/L — SIGNIFICANT CHANGE UP (ref 0–41)
BASOPHILS # BLD AUTO: 0.02 K/UL — SIGNIFICANT CHANGE UP (ref 0–0.2)
BASOPHILS NFR BLD AUTO: 0.2 % — SIGNIFICANT CHANGE UP (ref 0–1)
BILIRUB SERPL-MCNC: 1.4 MG/DL — HIGH (ref 0.2–1.2)
BUN SERPL-MCNC: 23 MG/DL — HIGH (ref 10–20)
CALCIUM SERPL-MCNC: 9 MG/DL — SIGNIFICANT CHANGE UP (ref 8.4–10.4)
CHLORIDE SERPL-SCNC: 97 MMOL/L — LOW (ref 98–110)
CO2 SERPL-SCNC: 25 MMOL/L — SIGNIFICANT CHANGE UP (ref 17–32)
CREAT SERPL-MCNC: 0.8 MG/DL — SIGNIFICANT CHANGE UP (ref 0.7–1.5)
EGFR: 74 ML/MIN/1.73M2 — SIGNIFICANT CHANGE UP
EOSINOPHIL # BLD AUTO: 0.03 K/UL — SIGNIFICANT CHANGE UP (ref 0–0.7)
EOSINOPHIL NFR BLD AUTO: 0.4 % — SIGNIFICANT CHANGE UP (ref 0–8)
GLUCOSE BLDC GLUCOMTR-MCNC: 108 MG/DL — HIGH (ref 70–99)
GLUCOSE BLDC GLUCOMTR-MCNC: 128 MG/DL — HIGH (ref 70–99)
GLUCOSE BLDC GLUCOMTR-MCNC: 135 MG/DL — HIGH (ref 70–99)
GLUCOSE BLDC GLUCOMTR-MCNC: 137 MG/DL — HIGH (ref 70–99)
GLUCOSE SERPL-MCNC: 113 MG/DL — HIGH (ref 70–99)
HCT VFR BLD CALC: 36 % — LOW (ref 37–47)
HGB BLD-MCNC: 11.8 G/DL — LOW (ref 12–16)
IMM GRANULOCYTES NFR BLD AUTO: 0.5 % — HIGH (ref 0.1–0.3)
LYMPHOCYTES # BLD AUTO: 1.26 K/UL — SIGNIFICANT CHANGE UP (ref 1.2–3.4)
LYMPHOCYTES # BLD AUTO: 14.8 % — LOW (ref 20.5–51.1)
MAGNESIUM SERPL-MCNC: 1.6 MG/DL — LOW (ref 1.8–2.4)
MCHC RBC-ENTMCNC: 27.1 PG — SIGNIFICANT CHANGE UP (ref 27–31)
MCHC RBC-ENTMCNC: 32.8 G/DL — SIGNIFICANT CHANGE UP (ref 32–37)
MCV RBC AUTO: 82.6 FL — SIGNIFICANT CHANGE UP (ref 81–99)
MONOCYTES # BLD AUTO: 1.01 K/UL — HIGH (ref 0.1–0.6)
MONOCYTES NFR BLD AUTO: 11.9 % — HIGH (ref 1.7–9.3)
NEUTROPHILS # BLD AUTO: 6.16 K/UL — SIGNIFICANT CHANGE UP (ref 1.4–6.5)
NEUTROPHILS NFR BLD AUTO: 72.2 % — SIGNIFICANT CHANGE UP (ref 42.2–75.2)
NRBC # BLD: 0 /100 WBCS — SIGNIFICANT CHANGE UP (ref 0–0)
PHOSPHATE SERPL-MCNC: 2.1 MG/DL — SIGNIFICANT CHANGE UP (ref 2.1–4.9)
PLATELET # BLD AUTO: 192 K/UL — SIGNIFICANT CHANGE UP (ref 130–400)
POTASSIUM SERPL-MCNC: 3.4 MMOL/L — LOW (ref 3.5–5)
POTASSIUM SERPL-SCNC: 3.4 MMOL/L — LOW (ref 3.5–5)
PROT SERPL-MCNC: 6 G/DL — SIGNIFICANT CHANGE UP (ref 6–8)
RBC # BLD: 4.36 M/UL — SIGNIFICANT CHANGE UP (ref 4.2–5.4)
RBC # FLD: 13.6 % — SIGNIFICANT CHANGE UP (ref 11.5–14.5)
SODIUM SERPL-SCNC: 138 MMOL/L — SIGNIFICANT CHANGE UP (ref 135–146)
WBC # BLD: 8.52 K/UL — SIGNIFICANT CHANGE UP (ref 4.8–10.8)
WBC # FLD AUTO: 8.52 K/UL — SIGNIFICANT CHANGE UP (ref 4.8–10.8)

## 2023-01-10 PROCEDURE — 99233 SBSQ HOSP IP/OBS HIGH 50: CPT

## 2023-01-10 RX ORDER — POTASSIUM CHLORIDE 20 MEQ
40 PACKET (EA) ORAL ONCE
Refills: 0 | Status: COMPLETED | OUTPATIENT
Start: 2023-01-10 | End: 2023-01-10

## 2023-01-10 RX ORDER — CELECOXIB 200 MG/1
200 CAPSULE ORAL
Refills: 0 | Status: DISCONTINUED | OUTPATIENT
Start: 2023-01-10 | End: 2023-01-10

## 2023-01-10 RX ORDER — MAGNESIUM SULFATE 500 MG/ML
2 VIAL (ML) INJECTION ONCE
Refills: 0 | Status: COMPLETED | OUTPATIENT
Start: 2023-01-10 | End: 2023-01-10

## 2023-01-10 RX ORDER — SODIUM CHLORIDE 9 MG/ML
1000 INJECTION INTRAMUSCULAR; INTRAVENOUS; SUBCUTANEOUS
Refills: 0 | Status: DISCONTINUED | OUTPATIENT
Start: 2023-01-10 | End: 2023-01-13

## 2023-01-10 RX ORDER — CEFAZOLIN SODIUM 1 G
VIAL (EA) INJECTION
Refills: 0 | Status: DISCONTINUED | OUTPATIENT
Start: 2023-01-10 | End: 2023-01-13

## 2023-01-10 RX ORDER — CEFAZOLIN SODIUM 1 G
1000 VIAL (EA) INJECTION ONCE
Refills: 0 | Status: COMPLETED | OUTPATIENT
Start: 2023-01-10 | End: 2023-01-10

## 2023-01-10 RX ORDER — CEFAZOLIN SODIUM 1 G
1000 VIAL (EA) INJECTION EVERY 8 HOURS
Refills: 0 | Status: DISCONTINUED | OUTPATIENT
Start: 2023-01-10 | End: 2023-01-13

## 2023-01-10 RX ADMIN — Medication 100 MILLIGRAM(S): at 23:05

## 2023-01-10 RX ADMIN — ATORVASTATIN CALCIUM 20 MILLIGRAM(S): 80 TABLET, FILM COATED ORAL at 23:05

## 2023-01-10 RX ADMIN — Medication 81 MILLIGRAM(S): at 11:28

## 2023-01-10 RX ADMIN — ENOXAPARIN SODIUM 40 MILLIGRAM(S): 100 INJECTION SUBCUTANEOUS at 05:36

## 2023-01-10 RX ADMIN — Medication 100 MILLIGRAM(S): at 11:27

## 2023-01-10 RX ADMIN — Medication 5 MILLIGRAM(S): at 05:37

## 2023-01-10 RX ADMIN — REMDESIVIR 200 MILLIGRAM(S): 5 INJECTION INTRAVENOUS at 05:36

## 2023-01-10 RX ADMIN — Medication 25 GRAM(S): at 17:53

## 2023-01-10 RX ADMIN — SODIUM CHLORIDE 100 MILLILITER(S): 9 INJECTION INTRAMUSCULAR; INTRAVENOUS; SUBCUTANEOUS at 14:11

## 2023-01-10 RX ADMIN — Medication 40 MILLIEQUIVALENT(S): at 17:51

## 2023-01-10 RX ADMIN — Medication 5 MILLIGRAM(S): at 17:51

## 2023-01-10 NOTE — PROGRESS NOTE ADULT - SUBJECTIVE AND OBJECTIVE BOX
HOSPITALIST PROGRESS NOTE     Interval Events:    Febrile overnight to 102.8, given Tylenol and now afebrile     REVIEW OF SYSTEMS:  Negative except as noted above.     VITALS:  T(F): 97.4 (01-10 @ 07:55), Max: 99.2 (01-09 @ 23:23)  HR: 68 (01-10 @ 07:55) (68 - 68)  BP: 113/56 (01-10 @ 07:55) (113/56 - 113/56)  RR: 19 (01-10 @ 07:55) (19 - 19)  SpO2: 97% (01-10 @ 07:55) (97% - 97%)    PHYSICAL EXAM:   GENERAL: NAD, well-developed  HEAD:  Atraumatic, Normocephalic  NECK: Supple, No JVD  CHEST/LUNG: Clear to auscultation bilaterally; No wheeze  HEART: Regular rate and rhythm; No murmurs, rubs, or gallops  ABDOMEN: Soft, Nontender, Nondistended  EXTREMITIES:  No clubbing, cyanosis; Left knee TTP and mildly edematous, left foot TTP over midfoot but no specific point tenderness  SKIN: No rashes or lesions    I/Os:   none documented    MEDICATIONS:   acetaminophen     Tablet .. 650 milliGRAM(s) Oral every 6 hours PRN  aspirin enteric coated 81 milliGRAM(s) Oral daily  atorvastatin 20 milliGRAM(s) Oral at bedtime  dextrose 5%. 1000 milliLiter(s) (50 mL/Hr) IV Continuous <Continuous>  dextrose 5%. 1000 milliLiter(s) (100 mL/Hr) IV Continuous <Continuous>  dextrose 50% Injectable 25 Gram(s) IV Push once  dextrose 50% Injectable 12.5 Gram(s) IV Push once  dextrose 50% Injectable 25 Gram(s) IV Push once  dextrose Oral Gel 15 Gram(s) Oral once PRN  enoxaparin Injectable 40 milliGRAM(s) SubCutaneous every 24 hours  glucagon  Injectable 1 milliGRAM(s) IntraMuscular once  insulin lispro (ADMELOG) corrective regimen sliding scale   SubCutaneous Before meals and at bedtime  oxybutynin 5 milliGRAM(s) Oral two times a day  pantoprazole    Tablet 40 milliGRAM(s) Oral before breakfast    LABS:                         12.3   10.04 )-----------( 207      ( 09 Jan 2023 07:08 )             37.2     140  |  98  |  22<H>  ----------------------------<  135<H>          (01-09-23 @ 07:08)  3.7   |  30  |  0.8    Ca    9.5          (01-09-23 @ 07:08)  Mg     1.5         (01-09-23 @ 07:08)    TPro  6.3  /  Alb  3.6  /  TBili  0.9  /  DBili  x   /  AST  50<H>  /  ALT  22  /  AlkPhos  79       01-09-23 @ 07:08    Blood Glucose (Past 24 hours):  108 mg/dL (01-10 @ 08:14)  243 mg/dL (01-09 @ 21:46)  131 mg/dL (01-09 @ 17:24)  153 mg/dL (01-09 @ 11:20)    RADIOLOGY:   pending foot XR read HOSPITALIST PROGRESS NOTE     Interval Events:    Febrile overnight to 102.8, given Tylenol and now afebrile     REVIEW OF SYSTEMS:  Negative except as noted above.     VITALS:  T(F): 97.4 (01-10 @ 07:55), Max: 99.2 (01-09 @ 23:23)  HR: 68 (01-10 @ 07:55) (68 - 68)  BP: 113/56 (01-10 @ 07:55) (113/56 - 113/56)  RR: 19 (01-10 @ 07:55) (19 - 19)  SpO2: 97% (01-10 @ 07:55) (97% - 97%)    PHYSICAL EXAM:   GENERAL: NAD, well-developed  HEAD:  Atraumatic, Normocephalic  NECK: Supple, No JVD  CHEST/LUNG: Clear to auscultation bilaterally; No wheeze  HEART: Regular rate and rhythm; No murmurs, rubs, or gallops  ABDOMEN: Soft, Nontender, Nondistended  EXTREMITIES:  No clubbing, cyanosis; Left knee TTP and mildly edematous, left foot TTP over midfoot but no specific point tenderness  SKIN: No rashes or lesions; former right PIV site erythematous    I/Os:   none documented    MEDICATIONS:   acetaminophen     Tablet .. 650 milliGRAM(s) Oral every 6 hours PRN  aspirin enteric coated 81 milliGRAM(s) Oral daily  atorvastatin 20 milliGRAM(s) Oral at bedtime  dextrose 5%. 1000 milliLiter(s) (50 mL/Hr) IV Continuous <Continuous>  dextrose 5%. 1000 milliLiter(s) (100 mL/Hr) IV Continuous <Continuous>  dextrose 50% Injectable 25 Gram(s) IV Push once  dextrose 50% Injectable 12.5 Gram(s) IV Push once  dextrose 50% Injectable 25 Gram(s) IV Push once  dextrose Oral Gel 15 Gram(s) Oral once PRN  enoxaparin Injectable 40 milliGRAM(s) SubCutaneous every 24 hours  glucagon  Injectable 1 milliGRAM(s) IntraMuscular once  insulin lispro (ADMELOG) corrective regimen sliding scale   SubCutaneous Before meals and at bedtime  oxybutynin 5 milliGRAM(s) Oral two times a day  pantoprazole    Tablet 40 milliGRAM(s) Oral before breakfast    LABS:                         12.3   10.04 )-----------( 207      ( 09 Jan 2023 07:08 )             37.2     140  |  98  |  22<H>  ----------------------------<  135<H>          (01-09-23 @ 07:08)  3.7   |  30  |  0.8    Ca    9.5          (01-09-23 @ 07:08)  Mg     1.5         (01-09-23 @ 07:08)    TPro  6.3  /  Alb  3.6  /  TBili  0.9  /  DBili  x   /  AST  50<H>  /  ALT  22  /  AlkPhos  79       01-09-23 @ 07:08    Blood Glucose (Past 24 hours):  108 mg/dL (01-10 @ 08:14)  243 mg/dL (01-09 @ 21:46)  131 mg/dL (01-09 @ 17:24)  153 mg/dL (01-09 @ 11:20)    RADIOLOGY:   pending foot XR read

## 2023-01-10 NOTE — PROGRESS NOTE ADULT - ASSESSMENT
82-year-old patient with Hx of HTN, DM, DL, presents with lightheadedness that she felt when she was moving from the bathroom to her room causing her to fall on her knees. She did not lose consciousness or hit her head. She's been complaining of cough and fever for the past 3-4 days , + sick contact (her grandson tested COVID+). She has decreased PO intake for 2 days.    #Fall  #Left knee effusion likely due to osteoarthritis + trauma  XR knee, left (01/09: Large knee joint effusion; No fractures  Echo 01/09: 60-65% EF  most likely orthostatic hypotension from dehydration  responded to iv fluid  Pt was unable to ambulate with PT yesterday due to severe left knee pain  s/p Ortho eval - recommend cold compress therapy, XR left foot, WBAT  - Pending PT re-eval today for disposition  - f/u XR left foot (pending read)     #Covid +  positive on 1/6/2022, symptoms started on 1/2/2022  sp RDV x3d  - saturating well on RA. no wheezing on exam. No steroids for now  - no signs of bacterial pneumonia. monitor off abx for now  - ID consult appreciated    #DM  #DL  - hold home pioglitazone, metformin, and trulicity  - Cont ISS. monitor FS (goal 140-180)  - A1c: 5.5  - lipid profile WNL (except HDL: 37)  - cw statin    #HTN  - hold for now    DVT PPX, Lovenox    #Progress Note Handoff  Pending (specify): PT re-eval, pain control  Family discussion: d/w pt regarding dc planning   Disposition: TBD   82-year-old patient with Hx of HTN, DM, DL, presents with lightheadedness that she felt when she was moving from the bathroom to her room causing her to fall on her knees. She did not lose consciousness or hit her head. She's been complaining of cough and fever for the past 3-4 days , + sick contact (her grandson tested COVID+). She has decreased PO intake for 2 days.    #Fall  #Left knee effusion likely due to osteoarthritis + trauma  XR knee, left (01/09: Large knee joint effusion; No fractures  XR Foot 01/09: No acute fractures; diffuse soft tissue swelling; DJD  Echo 01/09: 60-65% EF  most likely orthostatic hypotension from dehydration  responded to iv fluid  Pt was unable to ambulate with PT yesterday due to severe left knee pain  s/p Ortho eval - recommend cold compress therapy, WBAT  - PT Re-eval, WBAT  - Pain control    #Suspected thrombophlebitis, right arm PIV site  IV removed yesterday. Pt had fever yesterday evening to 102.8. PIV site erythematous, but not tender  - Draw BCx today  - Start IV cefazolin 1g q8h  - If pt is free of fevers for 24h, can DC on vantin 400 BID x 7 days    #Covid +  positive on 1/6/2022, symptoms started on 1/2/2022  sp RDV x3d  - saturating well on RA. no wheezing on exam. No steroids for now  - no signs of bacterial pneumonia. monitor off abx for now  - ID consult appreciated    #DM  #DL  - hold home pioglitazone, metformin, and trulicity  - Cont ISS. monitor FS (goal 140-180)  - A1c: 5.5  - lipid profile WNL (except HDL: 37)  - cw statin    #HTN  - hold for now, should DC off antihypertensives    DVT PPX, Lovenox    #Progress Note Handoff  Pending (specify): PT re-eval, pain control  Family discussion: d/w pt regarding IV abx for suspected cellulitis  Disposition: TBD   82-year-old patient with Hx of HTN, DM, DL, presents with lightheadedness that she felt when she was moving from the bathroom to her room causing her to fall on her knees. She did not lose consciousness or hit her head. She's been complaining of cough and fever for the past 3-4 days , + sick contact (her grandson tested COVID+). She has decreased PO intake for 2 days.    #Fall  #Left knee effusion likely due to osteoarthritis + trauma  XR knee, left (01/09): Large knee joint effusion; No fractures  XR Foot 01/09: No acute fractures; diffuse soft tissue swelling; DJD  Echo 01/09: 60-65% EF  most likely orthostatic hypotension from dehydration  responded to iv fluid  Pt was unable to ambulate with PT yesterday due to severe left knee pain  s/p Ortho eval - recommend cold compress therapy, WBAT  Per PT 01/10, pt felt faint when standing so couldn't complete assessment. 8/10 pain when bearing weight to left knee. She does not have pain at rest.  - Cont PT  - Pain control  - Start celecoxib 200 BID x 7d  - IVF 100cc for 24h  - Cont cold compress therapy    #Fevers  #Suspected thrombophlebitis, right arm PIV site  IV removed yesterday. Pt had fever yesterday evening to 102.8. Possibly from COVID, but PIV site erythematous, not tender  - Draw BCx today  - Start IV cefazolin 1g q8h  - f/u ESR/CRP tomorrow. If markedly elevated and/or bcx +, ortho re-eval to r/o septic arthritis (low suspicion for now)  - If pt is free of fevers for 24h, can DC on vantin 400 BID x 7 days    #Covid +  positive on 1/6/2022, symptoms started on 1/2/2022  sp RDV x3d  - saturating well on RA. no wheezing on exam. No steroids for now  - no signs of bacterial pneumonia  - ID consult appreciated    #DM  #DL  - hold home pioglitazone, metformin, and trulicity  - Cont ISS. monitor FS (goal 140-180)  - A1c: 5.5  - lipid profile WNL (except HDL: 37)  - cw statin    #HTN  - hold for now, should DC off antihypertensives    DVT PPX, Lovenox    #Progress Note Handoff  Pending (specify): PT re-eval, pain control  Family discussion: d/w pt regarding IV abx for suspected cellulitis  Disposition: TBD   82-year-old patient with Hx of HTN, DM, DL, presents with lightheadedness that she felt when she was moving from the bathroom to her room causing her to fall on her knees. She did not lose consciousness or hit her head. She's been complaining of cough and fever for the past 3-4 days , + sick contact (her grandson tested COVID+). She has decreased PO intake for 2 days.    #Fall  #Left knee effusion likely due to osteoarthritis + trauma  XR knee, left (01/09): Large knee joint effusion; No fractures  XR Foot 01/09: No acute fractures; diffuse soft tissue swelling; DJD  Echo 01/09: 60-65% EF  most likely orthostatic hypotension from dehydration  responded to iv fluid  Pt was unable to ambulate with PT yesterday due to severe left knee pain  s/p Ortho eval - recommend cold compress therapy, WBAT  Per PT 01/10, pt felt faint when standing so couldn't complete assessment. 8/10 pain when bearing weight to left knee. She does not have pain at rest.  - Cont PT  - Pain control  - IVF 100cc for 24h  - Cont cold compress therapy    #Fevers  #Suspected thrombophlebitis, right arm PIV site  IV removed yesterday. Pt had fever yesterday evening to 102.8. Possibly from COVID, but PIV site erythematous, not tender  - Draw BCx today  - Start IV cefazolin 1g q8h  - f/u ESR/CRP tomorrow. If markedly elevated and/or bcx +, ortho re-eval to r/o septic arthritis (low suspicion for now)  - If pt is free of fevers for 24h, can DC on vantin 400 BID x 7 days    #Covid +  positive on 1/6/2022, symptoms started on 1/2/2022  sp RDV x3d  - saturating well on RA. no wheezing on exam. No steroids for now  - no signs of bacterial pneumonia  - ID consult appreciated    #DM  #DL  - hold home pioglitazone, metformin, and trulicity  - Cont ISS. monitor FS (goal 140-180)  - A1c: 5.5  - lipid profile WNL (except HDL: 37)  - cw statin    #HTN  - hold for now, should DC off antihypertensives    DVT PPX, Lovenox    #Progress Note Handoff  Pending (specify): PT re-eval, pain control  Family discussion: d/w pt regarding IV abx for suspected cellulitis. Daughter would like pt to go to [facility] if needing STR.  Disposition: TBD

## 2023-01-10 NOTE — PHYSICAL THERAPY INITIAL EVALUATION ADULT - ADDITIONAL COMMENTS
Pt lives alone in house with 1 ISRAEL, 1 FOS inside. Pt independent in ambulation and ADLs, 1 fall prior to this admission. Pt has SC and RW.
Pt lives alone in house with 1 [place], 1 FOS inside. Pt independent in ambulation and ADLs, 1 fall prior to this admission. Pt has SC and RW, uses intermittently.

## 2023-01-10 NOTE — PHYSICAL THERAPY INITIAL EVALUATION ADULT - GENERAL OBSERVATIONS, REHAB EVAL
[id number] Pt attempted for PT IE, began session, subjective, PSH taken (see below). Pt repositioned BLE in bed, screamed in pain. PT noted L knee swelling, TTP at tibiofemoral joint. Session on hold until further evaluation, pt reports falling on L knee prior to admission. Dr Rojas made aware via teams. PT to f/u pending further w/u.
[id number] Pt received and left semifowlers in bed, NAD, pt agreeable to PT session. +IV

## 2023-01-10 NOTE — PHYSICAL THERAPY INITIAL EVALUATION ADULT - PERTINENT HX OF CURRENT PROBLEM, REHAB EVAL
82-year-old patient with Hx of HTN, DM, DL, presents with lightheadedness that she felt when she was moving from the bathroom to her room causing her to fall on her knees. She did not lose consciousness or hit her head. She's been complaining of cough and fever for the past 3-4 days , + sick contact (her grandson tested COVID+). She has decreased PO intake for 2 days.  Patient lives alone, ambulates independently but has a cane and a walker PRN. She denies SOB, chest pain, N/V/D, urinary sx or abnormal BMs.

## 2023-01-10 NOTE — PATIENT PROFILE ADULT - FALL HARM RISK - RISK INTERVENTIONS

## 2023-01-10 NOTE — PHYSICAL THERAPY INITIAL EVALUATION ADULT - GAIT DISTANCE, PT EVAL
ambulation limited by L knee pain with weightbearing, pt c/o feeling faint with standing, returned swiftly to supine position, unable to take orthostatics/5 feet

## 2023-01-11 LAB
ALBUMIN SERPL ELPH-MCNC: 3.1 G/DL — LOW (ref 3.5–5.2)
ALP SERPL-CCNC: 66 U/L — SIGNIFICANT CHANGE UP (ref 30–115)
ALT FLD-CCNC: 17 U/L — SIGNIFICANT CHANGE UP (ref 0–41)
ANION GAP SERPL CALC-SCNC: 9 MMOL/L — SIGNIFICANT CHANGE UP (ref 7–14)
APTT BLD: 34.3 SEC — SIGNIFICANT CHANGE UP (ref 27–39.2)
AST SERPL-CCNC: 28 U/L — SIGNIFICANT CHANGE UP (ref 0–41)
BASOPHILS # BLD AUTO: 0.02 K/UL — SIGNIFICANT CHANGE UP (ref 0–0.2)
BASOPHILS NFR BLD AUTO: 0.2 % — SIGNIFICANT CHANGE UP (ref 0–1)
BILIRUB SERPL-MCNC: 0.8 MG/DL — SIGNIFICANT CHANGE UP (ref 0.2–1.2)
BUN SERPL-MCNC: 20 MG/DL — SIGNIFICANT CHANGE UP (ref 10–20)
CALCIUM SERPL-MCNC: 8.6 MG/DL — SIGNIFICANT CHANGE UP (ref 8.4–10.4)
CHLORIDE SERPL-SCNC: 101 MMOL/L — SIGNIFICANT CHANGE UP (ref 98–110)
CO2 SERPL-SCNC: 29 MMOL/L — SIGNIFICANT CHANGE UP (ref 17–32)
CREAT SERPL-MCNC: 0.7 MG/DL — SIGNIFICANT CHANGE UP (ref 0.7–1.5)
CRP SERPL-MCNC: 203.9 MG/L — HIGH
CULTURE RESULTS: SIGNIFICANT CHANGE UP
CULTURE RESULTS: SIGNIFICANT CHANGE UP
EGFR: 87 ML/MIN/1.73M2 — SIGNIFICANT CHANGE UP
EOSINOPHIL # BLD AUTO: 0.12 K/UL — SIGNIFICANT CHANGE UP (ref 0–0.7)
EOSINOPHIL NFR BLD AUTO: 1.4 % — SIGNIFICANT CHANGE UP (ref 0–8)
ERYTHROCYTE [SEDIMENTATION RATE] IN BLOOD: 95 MM/HR — HIGH (ref 0–20)
GLUCOSE BLDC GLUCOMTR-MCNC: 105 MG/DL — HIGH (ref 70–99)
GLUCOSE BLDC GLUCOMTR-MCNC: 129 MG/DL — HIGH (ref 70–99)
GLUCOSE BLDC GLUCOMTR-MCNC: 144 MG/DL — HIGH (ref 70–99)
GLUCOSE BLDC GLUCOMTR-MCNC: 175 MG/DL — HIGH (ref 70–99)
GLUCOSE BLDC GLUCOMTR-MCNC: 175 MG/DL — HIGH (ref 70–99)
GLUCOSE SERPL-MCNC: 99 MG/DL — SIGNIFICANT CHANGE UP (ref 70–99)
HCT VFR BLD CALC: 32.2 % — LOW (ref 37–47)
HGB BLD-MCNC: 10.8 G/DL — LOW (ref 12–16)
IMM GRANULOCYTES NFR BLD AUTO: 0.4 % — HIGH (ref 0.1–0.3)
INR BLD: 1.37 RATIO — HIGH (ref 0.65–1.3)
LYMPHOCYTES # BLD AUTO: 1.19 K/UL — LOW (ref 1.2–3.4)
LYMPHOCYTES # BLD AUTO: 14.3 % — LOW (ref 20.5–51.1)
MAGNESIUM SERPL-MCNC: 2.1 MG/DL — SIGNIFICANT CHANGE UP (ref 1.8–2.4)
MCHC RBC-ENTMCNC: 27.1 PG — SIGNIFICANT CHANGE UP (ref 27–31)
MCHC RBC-ENTMCNC: 33.5 G/DL — SIGNIFICANT CHANGE UP (ref 32–37)
MCV RBC AUTO: 80.7 FL — LOW (ref 81–99)
MONOCYTES # BLD AUTO: 0.73 K/UL — HIGH (ref 0.1–0.6)
MONOCYTES NFR BLD AUTO: 8.8 % — SIGNIFICANT CHANGE UP (ref 1.7–9.3)
NEUTROPHILS # BLD AUTO: 6.22 K/UL — SIGNIFICANT CHANGE UP (ref 1.4–6.5)
NEUTROPHILS NFR BLD AUTO: 74.9 % — SIGNIFICANT CHANGE UP (ref 42.2–75.2)
NRBC # BLD: 0 /100 WBCS — SIGNIFICANT CHANGE UP (ref 0–0)
PHOSPHATE SERPL-MCNC: 1.7 MG/DL — LOW (ref 2.1–4.9)
PLATELET # BLD AUTO: 225 K/UL — SIGNIFICANT CHANGE UP (ref 130–400)
POTASSIUM SERPL-MCNC: 4.1 MMOL/L — SIGNIFICANT CHANGE UP (ref 3.5–5)
POTASSIUM SERPL-SCNC: 4.1 MMOL/L — SIGNIFICANT CHANGE UP (ref 3.5–5)
PROT SERPL-MCNC: 5.5 G/DL — LOW (ref 6–8)
PROTHROM AB SERPL-ACNC: 15.8 SEC — HIGH (ref 9.95–12.87)
RBC # BLD: 3.99 M/UL — LOW (ref 4.2–5.4)
RBC # FLD: 13.4 % — SIGNIFICANT CHANGE UP (ref 11.5–14.5)
SODIUM SERPL-SCNC: 139 MMOL/L — SIGNIFICANT CHANGE UP (ref 135–146)
SPECIMEN SOURCE: SIGNIFICANT CHANGE UP
SPECIMEN SOURCE: SIGNIFICANT CHANGE UP
WBC # BLD: 8.31 K/UL — SIGNIFICANT CHANGE UP (ref 4.8–10.8)
WBC # FLD AUTO: 8.31 K/UL — SIGNIFICANT CHANGE UP (ref 4.8–10.8)

## 2023-01-11 PROCEDURE — 99231 SBSQ HOSP IP/OBS SF/LOW 25: CPT

## 2023-01-11 RX ORDER — OXYCODONE AND ACETAMINOPHEN 5; 325 MG/1; MG/1
1 TABLET ORAL ONCE
Refills: 0 | Status: DISCONTINUED | OUTPATIENT
Start: 2023-01-11 | End: 2023-01-11

## 2023-01-11 RX ORDER — ACETAMINOPHEN 500 MG
650 TABLET ORAL EVERY 6 HOURS
Refills: 0 | Status: DISCONTINUED | OUTPATIENT
Start: 2023-01-11 | End: 2023-01-13

## 2023-01-11 RX ADMIN — Medication 650 MILLIGRAM(S): at 11:15

## 2023-01-11 RX ADMIN — Medication 100 MILLIGRAM(S): at 22:02

## 2023-01-11 RX ADMIN — ATORVASTATIN CALCIUM 20 MILLIGRAM(S): 80 TABLET, FILM COATED ORAL at 22:02

## 2023-01-11 RX ADMIN — Medication 100 MILLIGRAM(S): at 05:34

## 2023-01-11 RX ADMIN — Medication 2: at 11:46

## 2023-01-11 RX ADMIN — PANTOPRAZOLE SODIUM 40 MILLIGRAM(S): 20 TABLET, DELAYED RELEASE ORAL at 06:02

## 2023-01-11 RX ADMIN — Medication 650 MILLIGRAM(S): at 17:58

## 2023-01-11 RX ADMIN — Medication 650 MILLIGRAM(S): at 11:45

## 2023-01-11 RX ADMIN — ENOXAPARIN SODIUM 40 MILLIGRAM(S): 100 INJECTION SUBCUTANEOUS at 05:34

## 2023-01-11 RX ADMIN — Medication 650 MILLIGRAM(S): at 18:00

## 2023-01-11 RX ADMIN — Medication 5 MILLIGRAM(S): at 05:33

## 2023-01-11 RX ADMIN — Medication 81 MILLIGRAM(S): at 11:15

## 2023-01-11 RX ADMIN — Medication 100 MILLIGRAM(S): at 15:00

## 2023-01-11 RX ADMIN — Medication 5 MILLIGRAM(S): at 17:58

## 2023-01-11 NOTE — PROGRESS NOTE ADULT - ATTENDING COMMENTS
a/p  # 82-year-old patient with Hx of HTN, DM, DL, presents with lightheadedness that she felt when she was moving from the bathroom to her room causing her to fall on her knees. She did not lose consciousness or hit her head. She's been complaining of cough and fever for the past 3-4 days , + sick contact (her grandson tested COVID+). She has decreased PO intake for 2 days    # Left knee pain and effusion likely due to osteoarthritis + trauma  XR knee, left (01/09): Large knee joint effusion; No fractures  cont pain meds and PT     #Fevers due to covid and Suspected thrombophlebitis, right arm PIV site  cont abx     #Covid +  positive on 1/6/2022, symptoms started on 1/2/2022  sp RDV x3d    #DM cont current management   CAPILLARY BLOOD GLUCOSE    POCT Blood Glucose.: 175 mg/dL (11 Jan 2023 11:44)  POCT Blood Glucose.: 105 mg/dL (11 Jan 2023 07:25)  POCT Blood Glucose.: 135 mg/dL (10 Stevie 2023 21:52)  POCT Blood Glucose.: 128 mg/dL (10 Stevie 2023 17:30)    #DL, HTN cont meds    #Progress Note Handoff  Pending (specify): discharge planning   Family discussion: alberto pt   Disposition: STR

## 2023-01-11 NOTE — PROGRESS NOTE ADULT - ASSESSMENT
82-year-old patient with Hx of HTN, DM, DL, presents with lightheadedness that she felt when she was moving from the bathroom to her room causing her to fall on her knees. She did not lose consciousness or hit her head. She's been complaining of cough and fever for the past 3-4 days , + sick contact (her grandson tested COVID+). She has decreased PO intake for 2 days.    #Fall  #Left knee effusion likely due to osteoarthritis + trauma  - XR knee, left (01/09): Large knee joint effusion; No fractures  - XR Foot 01/09: No acute fractures; diffuse soft tissue swelling; DJD  - Echo 01/09: 60-65% EF  - most likely orthostatic hypotension from dehydration, responded to iv fluids  - Pt was unable to ambulate with PT yesterday due to severe left knee pain  - s/p Ortho eval - recommend cold compress therapy, WBAT  - Per PT 1/10: Ambulation limited by L knee pain. Pt felt faint when standing so couldn't complete assessment. 8/10 pain when bearing weight to left knee. She does not have pain at rest.  - Cont PT  - Pain control  - Cont cold compress therapy    #Fevers  #Suspected thrombophlebitis, right arm PIV site  IV removed yesterday. Pt had fever yesterday evening to 102.8. Possibly from COVID, but PIV site erythematous, not tender  - Start IV cefazolin 1g q8h  - F/u Blood Cx 1/10  - ESR 95, .9  - Has been afebrile past 24h   - Possible ortho re-eval to r/o septic arthritis (low suspicion for now)  - If pt is free of fevers for 24h, can DC on vantin 400 BID x 7 days    #Covid+  - Positive test 1/6/2022, symptoms started on 1/2/2022  - S/p RDV x3d  - saturating well on RA. no wheezing on exam. No steroids for now  - no signs of bacterial pneumonia  - ID consult appreciated    #DM  #DL  - A1c: 5.5  - lipid profile WNL (except HDL: 37)  - Hold home pioglitazone, metformin, and trulicity  - Cont ISS. monitor FS (goal 140-180)  - cw Lipitor 20mg qHS    #HTN  - hold for now, should DC off antihypertensives    #Other  DVT PPX: Lovenox q24h  GI PPX: Protonix PO q24h  Diet: DASH/TLC, carb consistent  Activity: increase as tolerated  Code status: full code  Dispo: From home. PT recs: home versus rehab pending progress. Daughter would like [facility] if needs STR.

## 2023-01-11 NOTE — PROGRESS NOTE ADULT - SUBJECTIVE AND OBJECTIVE BOX
SAUD VILLAVICENCIO 82y Female  MRN#: [id number]  Hospital Day: 5d    Pt is currently admitted with the primary diagnosis of fall    HPI: This is an 82-year-old patient with Hx of HTN, DM, DL, presents with lightheadedness that she felt when she was moving from the bathroom to her room causing her to fall on her knees. She did not lose consciousness or hit her head. She's been complaining of cough and fever for the past 3-4 days , + sick contact (her grandson tested COVID+). She has decreased PO intake for 2 days.  Patient lives alone, ambulates independently but has a cane and a walker PRN. She denies SOB, chest pain, N/V/D, urinary sx or abnormal BMs.    In the ED vitals were stable. normal O2 sat on RA.  Patient was found to be COVID+. Labs notable for T bili 1.5. otherwise normal. normal trops.  EKG: NSR with PACs.  Xray pelvis negative  CXR pending read  CTH initially showed possible hemorrhage or mass lesion --> repeat head CT was negative    SUBJECTIVE  Overnight events: None  Subjective complaints: L knee pain, but reports feeling better than yesterday with warm compress therapy.    Present Today:   - Chu:  No [ x ], Yes [   ] : Indication:     - Type of IV Access:       .. CVC/Piccline:  No [ x ], Yes [   ] : Indication:       .. Midline: No [ x ], Yes [   ] : Indication:                                             ----------------------------------------------------------  OBJECTIVE    VITAL SIGNS: Last 24 Hours  T(C): 37.6 (11 Jan 2023 05:00), Max: 37.6 (11 Jan 2023 05:00)  T(F): 99.6 (11 Jan 2023 05:00), Max: 99.6 (11 Jan 2023 05:00)  HR: 76 (11 Jan 2023 05:00) (72 - 88)  BP: 114/55 (11 Jan 2023 05:00) (108/76 - 119/74)  BP(mean): --  RR: 18 (11 Jan 2023 05:00) (18 - 18)  SpO2: 95% (11 Jan 2023 05:00) (95% - 98%)    01-10-23 @ 07:01  -  01-11-23 @ 07:00  --------------------------------------------------------  IN: 0 mL / OUT: 300 mL / NET: -300 mL      PHYSICAL EXAM:  General: well-appearing in NAD.  HEENT: Normocephalic, nontraumatic. PERRL.  LUNGS: Clear to auscultation b/l. No wheezes, rales, or rhonchi.  HEART: RRR. No murmurs, rubs, or gallops.  ABDOMEN: Soft, nontender, nondistended. + bowel sounds.  EXT: Pulses palpable x 4. No lower extremity edema. L knee effusion, flexion to ~20 degrees, limited by pain.  NEURO: AAOx4.  SKIN: Warm, dry.    PAST MEDICAL & SURGICAL HISTORY                                            -----------------------------------------------------------  ALLERGIES:  No Known Allergies                                            ------------------------------------------------------------    HOME MEDICATIONS  Home Medications:  aspirin 81 mg oral delayed release tablet: 1 tab(s) orally once a day (07 Jan 2023 02:27)  atorvastatin 20 mg oral tablet: 1 tab(s) orally once a day (07 Jan 2023 02:26)  losartan-hydrochlorothiazide 50 mg-12.5 mg oral tablet: 1 tab(s) orally once a day (07 Jan 2023 02:27)  metFORMIN 500 mg oral tablet, extended release: 1 tab(s) orally once a day (07 Jan 2023 02:25)  oxybutynin 5 mg oral tablet: 1 tab(s) orally 2 times a day (07 Jan 2023 02:26)  pioglitazone 30 mg oral tablet: 1 tab(s) orally once a day (07 Jan 2023 02:25)  Trulicity Pen 0.75 mg/0.5 mL subcutaneous solution: 1 application subcutaneous once a week (07 Jan 2023 02:25)                           MEDICATIONS:  STANDING MEDICATIONS  acetaminophen     Tablet .. 650 milliGRAM(s) Oral every 6 hours  aspirin enteric coated 81 milliGRAM(s) Oral daily  atorvastatin 20 milliGRAM(s) Oral at bedtime  ceFAZolin   IVPB      ceFAZolin   IVPB 1000 milliGRAM(s) IV Intermittent every 8 hours  dextrose 5%. 1000 milliLiter(s) IV Continuous <Continuous>  dextrose 5%. 1000 milliLiter(s) IV Continuous <Continuous>  dextrose 50% Injectable 25 Gram(s) IV Push once  dextrose 50% Injectable 12.5 Gram(s) IV Push once  dextrose 50% Injectable 25 Gram(s) IV Push once  enoxaparin Injectable 40 milliGRAM(s) SubCutaneous every 24 hours  glucagon  Injectable 1 milliGRAM(s) IntraMuscular once  insulin lispro (ADMELOG) corrective regimen sliding scale   SubCutaneous Before meals and at bedtime  oxybutynin 5 milliGRAM(s) Oral two times a day  pantoprazole    Tablet 40 milliGRAM(s) Oral before breakfast  sodium chloride 0.9%. 1000 milliLiter(s) IV Continuous <Continuous>    PRN MEDICATIONS  dextrose Oral Gel 15 Gram(s) Oral once PRN                                           --------------------------------------------------------------  LABS:                        10.8   8.31  )-----------( 225      ( 11 Jan 2023 06:34 )             32.2     01-11    139  |  101  |  20  ----------------------------<  99  4.1   |  29  |  0.7    Ca    8.6      11 Jan 2023 06:34  Phos  1.7     01-11  Mg     2.1     01-11    TPro  5.5<L>  /  Alb  3.1<L>  /  TBili  0.8  /  DBili  x   /  AST  28  /  ALT  17  /  AlkPhos  66  01-11    PT/INR - ( 11 Jan 2023 06:34 )   PT: 15.80 sec;   INR: 1.37 ratio    PTT - ( 11 Jan 2023 06:34 )  PTT:34.3 sec    Sedimentation Rate, Erythrocyte: 95 mm/Hr *H* (01-11-23 @ 06:34)    CAPILLARY BLOOD GLUCOSE  POCT Blood Glucose.: 105 mg/dL (11 Jan 2023 07:25)  POCT Blood Glucose.: 135 mg/dL (10 Stevie 2023 21:52)  POCT Blood Glucose.: 128 mg/dL (10 Stevie 2023 17:30)  POCT Blood Glucose.: 137 mg/dL (10 Stevie 2023 12:16)                                            -------------------------------------------------------------  RADIOLOGY:    < from: Xray Foot AP + Lateral, Left (01.09.23 @ 17:49) >  IMPRESSION:  No acute fractures or dislocations. Degenerative changes of the forefoot,   midfoot, and hindfoot. Diffuse soft tissue swelling on the hindfoot   plantar aspect.    < from: Xray Knee AP + Lateral + Oblique 3 Views, Left (01.09.23 @ 13:07) >  IMPRESSION:  1.  No acute fracture identified.  2.  Large knee joint effusion.    < from: [facility] Duplex Lower Ext Vein Scan, Bilat (01.07.23 @ 17:08) >  IMPRESSION:  No evidence of deep venous thrombosis in either lower extremity.                                            -------------------------------------------------------------- SAUD VILLAVICENCIO 82y Female  MRN#: [id number]  Hospital Day: 5d    Pt is currently admitted with the primary diagnosis of fall    HPI: This is an 82-year-old patient with Hx of HTN, DM, DL, presents with lightheadedness that she felt when she was moving from the bathroom to her room causing her to fall on her knees. She did not lose consciousness or hit her head. She's been complaining of cough and fever for the past 3-4 days , + sick contact (her grandson tested COVID+). She has decreased PO intake for 2 days.  Patient lives alone, ambulates independently but has a cane and a walker PRN. She denies SOB, chest pain, N/V/D, urinary sx or abnormal BMs.    In the ED vitals were stable. normal O2 sat on RA.  Patient was found to be COVID+. Labs notable for T bili 1.5. otherwise normal. normal trops.  EKG: NSR with PACs.  Xray pelvis negative  CXR pending read  CTH initially showed possible hemorrhage or mass lesion --> repeat head CT was negative    SUBJECTIVE  Overnight events: None  Subjective complaints: L knee pain, but reports feeling better than yesterday with warm compress therapy.    ROS no cp, no sob     Present Today:   - Chu:  No [ x ], Yes [   ] : Indication:     - Type of IV Access:       .. CVC/Piccline:  No [ x ], Yes [   ] : Indication:       .. Midline: No [ x ], Yes [   ] : Indication:                                             ----------------------------------------------------------  OBJECTIVE    VITAL SIGNS: Last 24 Hours  T(C): 37.6 (11 Jan 2023 05:00), Max: 37.6 (11 Jan 2023 05:00)  T(F): 99.6 (11 Jan 2023 05:00), Max: 99.6 (11 Jan 2023 05:00)  HR: 76 (11 Jan 2023 05:00) (72 - 88)  BP: 114/55 (11 Jan 2023 05:00) (108/76 - 119/74)  BP(mean): --  RR: 18 (11 Jan 2023 05:00) (18 - 18)  SpO2: 95% (11 Jan 2023 05:00) (95% - 98%)    01-10-23 @ 07:01  -  01-11-23 @ 07:00  --------------------------------------------------------  IN: 0 mL / OUT: 300 mL / NET: -300 mL      PHYSICAL EXAM:  General: well-appearing in NAD.  HEENT: Normocephalic, nontraumatic. PERRL.  LUNGS: Clear to auscultation b/l. No wheezes, rales, or rhonchi.  HEART: RRR. No murmurs, rubs, or gallops.  ABDOMEN: Soft, nontender, nondistended. + bowel sounds.  EXT: Pulses palpable x 4. No lower extremity edema. L knee effusion, flexion to ~20 degrees, limited by pain.  NEURO: AAOx4.  SKIN: Warm, dry.    PAST MEDICAL & SURGICAL HISTORY                                            -----------------------------------------------------------  ALLERGIES:  No Known Allergies                                            ------------------------------------------------------------    HOME MEDICATIONS  Home Medications:  aspirin 81 mg oral delayed release tablet: 1 tab(s) orally once a day (07 Jan 2023 02:27)  atorvastatin 20 mg oral tablet: 1 tab(s) orally once a day (07 Jan 2023 02:26)  losartan-hydrochlorothiazide 50 mg-12.5 mg oral tablet: 1 tab(s) orally once a day (07 Jan 2023 02:27)  metFORMIN 500 mg oral tablet, extended release: 1 tab(s) orally once a day (07 Jan 2023 02:25)  oxybutynin 5 mg oral tablet: 1 tab(s) orally 2 times a day (07 Jan 2023 02:26)  pioglitazone 30 mg oral tablet: 1 tab(s) orally once a day (07 Jan 2023 02:25)  Trulicity Pen 0.75 mg/0.5 mL subcutaneous solution: 1 application subcutaneous once a week (07 Jan 2023 02:25)                           MEDICATIONS:  STANDING MEDICATIONS  acetaminophen     Tablet .. 650 milliGRAM(s) Oral every 6 hours  aspirin enteric coated 81 milliGRAM(s) Oral daily  atorvastatin 20 milliGRAM(s) Oral at bedtime  ceFAZolin   IVPB      ceFAZolin   IVPB 1000 milliGRAM(s) IV Intermittent every 8 hours  dextrose 5%. 1000 milliLiter(s) IV Continuous <Continuous>  dextrose 5%. 1000 milliLiter(s) IV Continuous <Continuous>  dextrose 50% Injectable 25 Gram(s) IV Push once  dextrose 50% Injectable 12.5 Gram(s) IV Push once  dextrose 50% Injectable 25 Gram(s) IV Push once  enoxaparin Injectable 40 milliGRAM(s) SubCutaneous every 24 hours  glucagon  Injectable 1 milliGRAM(s) IntraMuscular once  insulin lispro (ADMELOG) corrective regimen sliding scale   SubCutaneous Before meals and at bedtime  oxybutynin 5 milliGRAM(s) Oral two times a day  pantoprazole    Tablet 40 milliGRAM(s) Oral before breakfast  sodium chloride 0.9%. 1000 milliLiter(s) IV Continuous <Continuous>    PRN MEDICATIONS  dextrose Oral Gel 15 Gram(s) Oral once PRN                                           --------------------------------------------------------------  LABS:                        10.8   8.31  )-----------( 225      ( 11 Jan 2023 06:34 )             32.2     01-11    139  |  101  |  20  ----------------------------<  99  4.1   |  29  |  0.7    Ca    8.6      11 Jan 2023 06:34  Phos  1.7     01-11  Mg     2.1     01-11    TPro  5.5<L>  /  Alb  3.1<L>  /  TBili  0.8  /  DBili  x   /  AST  28  /  ALT  17  /  AlkPhos  66  01-11    PT/INR - ( 11 Jan 2023 06:34 )   PT: 15.80 sec;   INR: 1.37 ratio    PTT - ( 11 Jan 2023 06:34 )  PTT:34.3 sec    Sedimentation Rate, Erythrocyte: 95 mm/Hr *H* (01-11-23 @ 06:34)    CAPILLARY BLOOD GLUCOSE  POCT Blood Glucose.: 105 mg/dL (11 Jan 2023 07:25)  POCT Blood Glucose.: 135 mg/dL (10 Stevie 2023 21:52)  POCT Blood Glucose.: 128 mg/dL (10 Stevie 2023 17:30)  POCT Blood Glucose.: 137 mg/dL (10 Stevie 2023 12:16)                                            -------------------------------------------------------------  RADIOLOGY:    < from: Xray Foot AP + Lateral, Left (01.09.23 @ 17:49) >  IMPRESSION:  No acute fractures or dislocations. Degenerative changes of the forefoot,   midfoot, and hindfoot. Diffuse soft tissue swelling on the hindfoot   plantar aspect.    < from: Xray Knee AP + Lateral + Oblique 3 Views, Left (01.09.23 @ 13:07) >  IMPRESSION:  1.  No acute fracture identified.  2.  Large knee joint effusion.    < from: [facility] Duplex Lower Ext Vein Scan, Bilat (01.07.23 @ 17:08) >  IMPRESSION:  No evidence of deep venous thrombosis in either lower extremity.                                            --------------------------------------------------------------

## 2023-01-12 LAB
ANION GAP SERPL CALC-SCNC: 8 MMOL/L — SIGNIFICANT CHANGE UP (ref 7–14)
BASOPHILS # BLD AUTO: 0.03 K/UL — SIGNIFICANT CHANGE UP (ref 0–0.2)
BASOPHILS NFR BLD AUTO: 0.4 % — SIGNIFICANT CHANGE UP (ref 0–1)
BUN SERPL-MCNC: 23 MG/DL — HIGH (ref 10–20)
CALCIUM SERPL-MCNC: 9.1 MG/DL — SIGNIFICANT CHANGE UP (ref 8.4–10.5)
CHLORIDE SERPL-SCNC: 100 MMOL/L — SIGNIFICANT CHANGE UP (ref 98–110)
CO2 SERPL-SCNC: 30 MMOL/L — SIGNIFICANT CHANGE UP (ref 17–32)
CREAT SERPL-MCNC: 0.8 MG/DL — SIGNIFICANT CHANGE UP (ref 0.7–1.5)
EGFR: 74 ML/MIN/1.73M2 — SIGNIFICANT CHANGE UP
EOSINOPHIL # BLD AUTO: 0.25 K/UL — SIGNIFICANT CHANGE UP (ref 0–0.7)
EOSINOPHIL NFR BLD AUTO: 3.7 % — SIGNIFICANT CHANGE UP (ref 0–8)
GLUCOSE BLDC GLUCOMTR-MCNC: 105 MG/DL — HIGH (ref 70–99)
GLUCOSE BLDC GLUCOMTR-MCNC: 110 MG/DL — HIGH (ref 70–99)
GLUCOSE BLDC GLUCOMTR-MCNC: 111 MG/DL — HIGH (ref 70–99)
GLUCOSE BLDC GLUCOMTR-MCNC: 134 MG/DL — HIGH (ref 70–99)
GLUCOSE SERPL-MCNC: 96 MG/DL — SIGNIFICANT CHANGE UP (ref 70–99)
HCT VFR BLD CALC: 34.5 % — LOW (ref 37–47)
HGB BLD-MCNC: 11.1 G/DL — LOW (ref 12–16)
IMM GRANULOCYTES NFR BLD AUTO: 0.4 % — HIGH (ref 0.1–0.3)
LYMPHOCYTES # BLD AUTO: 1.39 K/UL — SIGNIFICANT CHANGE UP (ref 1.2–3.4)
LYMPHOCYTES # BLD AUTO: 20.6 % — SIGNIFICANT CHANGE UP (ref 20.5–51.1)
MAGNESIUM SERPL-MCNC: 2.1 MG/DL — SIGNIFICANT CHANGE UP (ref 1.8–2.4)
MCHC RBC-ENTMCNC: 26.7 PG — LOW (ref 27–31)
MCHC RBC-ENTMCNC: 32.2 G/DL — SIGNIFICANT CHANGE UP (ref 32–37)
MCV RBC AUTO: 82.9 FL — SIGNIFICANT CHANGE UP (ref 81–99)
MONOCYTES # BLD AUTO: 0.63 K/UL — HIGH (ref 0.1–0.6)
MONOCYTES NFR BLD AUTO: 9.3 % — SIGNIFICANT CHANGE UP (ref 1.7–9.3)
NEUTROPHILS # BLD AUTO: 4.42 K/UL — SIGNIFICANT CHANGE UP (ref 1.4–6.5)
NEUTROPHILS NFR BLD AUTO: 65.6 % — SIGNIFICANT CHANGE UP (ref 42.2–75.2)
NRBC # BLD: 0 /100 WBCS — SIGNIFICANT CHANGE UP (ref 0–0)
PLATELET # BLD AUTO: 269 K/UL — SIGNIFICANT CHANGE UP (ref 130–400)
POTASSIUM SERPL-MCNC: 4.5 MMOL/L — SIGNIFICANT CHANGE UP (ref 3.5–5)
POTASSIUM SERPL-SCNC: 4.5 MMOL/L — SIGNIFICANT CHANGE UP (ref 3.5–5)
RBC # BLD: 4.16 M/UL — LOW (ref 4.2–5.4)
RBC # FLD: 13.6 % — SIGNIFICANT CHANGE UP (ref 11.5–14.5)
SARS-COV-2 RNA SPEC QL NAA+PROBE: DETECTED
SODIUM SERPL-SCNC: 138 MMOL/L — SIGNIFICANT CHANGE UP (ref 135–146)
WBC # BLD: 6.75 K/UL — SIGNIFICANT CHANGE UP (ref 4.8–10.8)
WBC # FLD AUTO: 6.75 K/UL — SIGNIFICANT CHANGE UP (ref 4.8–10.8)

## 2023-01-12 PROCEDURE — 99232 SBSQ HOSP IP/OBS MODERATE 35: CPT

## 2023-01-12 RX ADMIN — ATORVASTATIN CALCIUM 20 MILLIGRAM(S): 80 TABLET, FILM COATED ORAL at 21:17

## 2023-01-12 RX ADMIN — PANTOPRAZOLE SODIUM 40 MILLIGRAM(S): 20 TABLET, DELAYED RELEASE ORAL at 05:23

## 2023-01-12 RX ADMIN — Medication 650 MILLIGRAM(S): at 01:02

## 2023-01-12 RX ADMIN — Medication 650 MILLIGRAM(S): at 17:31

## 2023-01-12 RX ADMIN — Medication 81 MILLIGRAM(S): at 11:24

## 2023-01-12 RX ADMIN — Medication 100 MILLIGRAM(S): at 21:16

## 2023-01-12 RX ADMIN — Medication 100 MILLIGRAM(S): at 15:26

## 2023-01-12 RX ADMIN — Medication 650 MILLIGRAM(S): at 11:24

## 2023-01-12 RX ADMIN — Medication 5 MILLIGRAM(S): at 05:20

## 2023-01-12 RX ADMIN — Medication 650 MILLIGRAM(S): at 05:20

## 2023-01-12 RX ADMIN — Medication 100 MILLIGRAM(S): at 05:20

## 2023-01-12 RX ADMIN — ENOXAPARIN SODIUM 40 MILLIGRAM(S): 100 INJECTION SUBCUTANEOUS at 05:19

## 2023-01-12 RX ADMIN — Medication 5 MILLIGRAM(S): at 17:30

## 2023-01-12 NOTE — PROGRESS NOTE ADULT - ASSESSMENT
82-year-old patient with Hx of HTN, DM, DL, presents with lightheadedness that she felt when she was moving from the bathroom to her room causing her to fall on her knees. She did not lose consciousness or hit her head. She's been complaining of cough and fever for the past 3-4 days , + sick contact (her grandson tested COVID+). She has decreased PO intake for 2 days.    #Fall  #Left knee effusion likely due to osteoarthritis + trauma  - XR knee, left (01/09): Large knee joint effusion; No fractures  - XR Foot 01/09: No acute fractures; diffuse soft tissue swelling; DJD  - Echo 01/09: 60-65% EF  - most likely orthostatic hypotension from dehydration, responded to iv fluids  - Pt was unable to ambulate with PT yesterday due to severe left knee pain  - s/p Ortho eval - recommend cold compress therapy, WBAT  - Per PT 1/10: Ambulation limited by L knee pain. Pt felt faint when standing so couldn't complete assessment. 8/10 pain when bearing weight to left knee. She does not have pain at rest.  - Cont PT  - Pain control  - Cont cold compress therapy    #Fevers  #Suspected thrombophlebitis, right arm PIV site  IV removed yesterday. Pt had fever yesterday evening to 102.8. Possibly from COVID, but PIV site erythematous, not tender  - Start IV cefazolin 1g q8h  - F/u Blood Cx 1/10  - ESR 95, .9  - Has been afebrile past 24h   - Possible ortho re-eval to r/o septic arthritis (low suspicion for now)  - If pt is free of fevers for 24h, can DC on vantin 400 BID x 7 days    #Covid+  - Positive test 1/6/2022, symptoms started on 1/2/2022  - S/p RDV x3d  - saturating well on RA. no wheezing on exam. No steroids for now  - no signs of bacterial pneumonia  - ID consult appreciated    #DM  #DL  - A1c: 5.5  - lipid profile WNL (except HDL: 37)  - Hold home pioglitazone, metformin, and trulicity  - Cont ISS. monitor FS (goal 140-180)  - cw Lipitor 20mg qHS    #HTN  - hold for now, should DC off antihypertensives    #Other  DVT PPX: Lovenox q24h  GI PPX: Protonix PO q24h  Diet: DASH/TLC, carb consistent  Activity: increase as tolerated  Code status: full code  Dispo: From home. To rehab, pending auth.

## 2023-01-12 NOTE — PROGRESS NOTE ADULT - SUBJECTIVE AND OBJECTIVE BOX
SAUD VILLAVICENCIO 82y Female  MRN#: [id number]  Hospital Day: 6d    Pt is currently admitted with the primary diagnosis of    SUBJECTIVE  Overnight events:  Subjective complaints:    Present Today:   - Chu:  No [  ], Yes [   ] : Indication:     - Type of IV Access:       .. CVC/Piccline:  No [  ], Yes [   ] : Indication:       .. Midline: No [  ], Yes [   ] : Indication:                                             ----------------------------------------------------------  OBJECTIVE    VITAL SIGNS: Last 24 Hours  T(C): 36.7 (12 Jan 2023 04:48), Max: 36.8 (11 Jan 2023 20:00)  T(F): 98.1 (12 Jan 2023 04:48), Max: 98.3 (11 Jan 2023 20:00)  HR: 63 (12 Jan 2023 04:48) (63 - 77)  BP: 117/54 (12 Jan 2023 04:48) (109/76 - 117/54)  BP(mean): --  RR: 18 (12 Jan 2023 04:48) (18 - 18)  SpO2: 97% (11 Jan 2023 14:00) (97% - 97%)        PHYSICAL EXAM:  General: well-appearing in NAD.  HEENT: Normocephalic, nontraumatic. PERRL.  LUNGS: Clear to auscultation b/l. No wheezes, rales, or rhonchi.  HEART: RRR. No murmurs, rubs, or gallops.  ABDOMEN: Soft, nontender, nondistended. + bowel sounds.  EXT: Pulses palpable x 4. No lower extremity edema.  NEURO: AAOx4.  SKIN: Warm, dry.    PAST MEDICAL & SURGICAL HISTORY                                            -----------------------------------------------------------  ALLERGIES:  No Known Allergies                                            ------------------------------------------------------------    HOME MEDICATIONS  Home Medications:  aspirin 81 mg oral delayed release tablet: 1 tab(s) orally once a day (07 Jan 2023 02:27)  atorvastatin 20 mg oral tablet: 1 tab(s) orally once a day (07 Jan 2023 02:26)  losartan-hydrochlorothiazide 50 mg-12.5 mg oral tablet: 1 tab(s) orally once a day (07 Jan 2023 02:27)  metFORMIN 500 mg oral tablet, extended release: 1 tab(s) orally once a day (07 Jan 2023 02:25)  oxybutynin 5 mg oral tablet: 1 tab(s) orally 2 times a day (07 Jan 2023 02:26)  pioglitazone 30 mg oral tablet: 1 tab(s) orally once a day (07 Jan 2023 02:25)  Trulicity Pen 0.75 mg/0.5 mL subcutaneous solution: 1 application subcutaneous once a week (07 Jan 2023 02:25)                           MEDICATIONS:  STANDING MEDICATIONS  acetaminophen     Tablet .. 650 milliGRAM(s) Oral every 6 hours  aspirin enteric coated 81 milliGRAM(s) Oral daily  atorvastatin 20 milliGRAM(s) Oral at bedtime  ceFAZolin   IVPB      ceFAZolin   IVPB 1000 milliGRAM(s) IV Intermittent every 8 hours  dextrose 5%. 1000 milliLiter(s) IV Continuous <Continuous>  dextrose 5%. 1000 milliLiter(s) IV Continuous <Continuous>  dextrose 50% Injectable 25 Gram(s) IV Push once  dextrose 50% Injectable 12.5 Gram(s) IV Push once  dextrose 50% Injectable 25 Gram(s) IV Push once  enoxaparin Injectable 40 milliGRAM(s) SubCutaneous every 24 hours  glucagon  Injectable 1 milliGRAM(s) IntraMuscular once  insulin lispro (ADMELOG) corrective regimen sliding scale   SubCutaneous Before meals and at bedtime  oxybutynin 5 milliGRAM(s) Oral two times a day  pantoprazole    Tablet 40 milliGRAM(s) Oral before breakfast  sodium chloride 0.9%. 1000 milliLiter(s) IV Continuous <Continuous>    PRN MEDICATIONS  dextrose Oral Gel 15 Gram(s) Oral once PRN                                           --------------------------------------------------------------  LABS:                        11.1   6.75  )-----------( 269      ( 12 Jan 2023 07:43 )             34.5     01-12    138  |  100  |  23<H>  ----------------------------<  96  4.5   |  30  |  0.8    Ca    9.1      12 Jan 2023 07:43  Phos  1.7     01-11  Mg     2.1     01-12    TPro  5.5<L>  /  Alb  3.1<L>  /  TBili  0.8  /  DBili  x   /  AST  28  /  ALT  17  /  AlkPhos  66  01-11    PT/INR - ( 11 Jan 2023 06:34 )   PT: 15.80 sec;   INR: 1.37 ratio    PTT - ( 11 Jan 2023 06:34 )  PTT:34.3 sec    Culture - Blood (collected 10 Stevie 2023 11:13)  Source: .Blood None  Preliminary Report (11 Jan 2023 23:01):    No growth to date.    CAPILLARY BLOOD GLUCOSE  POCT Blood Glucose.: 105 mg/dL (12 Jan 2023 07:22)  POCT Blood Glucose.: 129 mg/dL (11 Jan 2023 22:36)  POCT Blood Glucose.: 175 mg/dL (11 Jan 2023 21:07)  POCT Blood Glucose.: 144 mg/dL (11 Jan 2023 16:27)  POCT Blood Glucose.: 175 mg/dL (11 Jan 2023 11:44)                                            -------------------------------------------------------------  RADIOLOGY:    < from: Xray Foot AP + Lateral, Left (01.09.23 @ 17:49) >  IMPRESSION:  No acute fractures or dislocations. Degenerative changes of the forefoot,   midfoot, and hindfoot. Diffuse soft tissue swelling on the hindfoot   plantar aspect.    < from: Xray Knee AP + Lateral + Oblique 3 Views, Left (01.09.23 @ 13:07) >  IMPRESSION:  1.  No acute fracture identified.  2.  Large knee joint effusion.    < from: [facility] Duplex Lower Ext Vein Scan, Bilat (01.07.23 @ 17:08) >  IMPRESSION:  No evidence of deep venous thrombosis in either lower extremity.                                            -------------------------------------------------------------- SAUD VILLAVICENCIO 82y Female  MRN#: [id number]  Hospital Day: 6d    Pt is currently admitted with the primary diagnosis of fall    HPI: This is an 82-year-old patient with Hx of HTN, DM, DL, presents with lightheadedness that she felt when she was moving from the bathroom to her room causing her to fall on her knees. She did not lose consciousness or hit her head. She's been complaining of cough and fever for the past 3-4 days , + sick contact (her grandson tested COVID+). She has decreased PO intake for 2 days.  Patient lives alone, ambulates independently but has a cane and a walker PRN. She denies SOB, chest pain, N/V/D, urinary sx or abnormal BMs.    In the ED vitals were stable. normal O2 sat on RA.  Patient was found to be COVID+. Labs notable for T bili 1.5. otherwise normal. normal trops.  EKG: NSR with PACs.  Xray pelvis negative  CXR pending read  CTH initially showed possible hemorrhage or mass lesion --> repeat head CT was negative    SUBJECTIVE  Overnight events: None  Subjective complaints: Residual L knee pain, improving.    Present Today:   - Chu:  No [ x ], Yes [   ] : Indication:     - Type of IV Access:       .. CVC/Piccline:  No [ x ], Yes [   ] : Indication:       .. Midline: No [ x ], Yes [   ] : Indication:                                             ----------------------------------------------------------  OBJECTIVE    VITAL SIGNS: Last 24 Hours  T(C): 36.7 (12 Jan 2023 04:48), Max: 36.8 (11 Jan 2023 20:00)  T(F): 98.1 (12 Jan 2023 04:48), Max: 98.3 (11 Jan 2023 20:00)  HR: 63 (12 Jan 2023 04:48) (63 - 77)  BP: 117/54 (12 Jan 2023 04:48) (109/76 - 117/54)  BP(mean): --  RR: 18 (12 Jan 2023 04:48) (18 - 18)  SpO2: 97% (11 Jan 2023 14:00) (97% - 97%)    PHYSICAL EXAM:  General: well-appearing in NAD.  HEENT: Normocephalic, nontraumatic. PERRL.  LUNGS: Clear to auscultation b/l. No wheezes, rales, or rhonchi.  HEART: RRR. No murmurs, rubs, or gallops.  ABDOMEN: Soft, nontender, nondistended. + bowel sounds.  EXT: Pulses palpable x 4. No lower extremity edema. L knee effusion, flexion to ~30 degrees.  NEURO: AAOx4.  SKIN: Warm, dry.    PAST MEDICAL & SURGICAL HISTORY                                            -----------------------------------------------------------  ALLERGIES:  No Known Allergies                                            ------------------------------------------------------------    HOME MEDICATIONS  Home Medications:  aspirin 81 mg oral delayed release tablet: 1 tab(s) orally once a day (07 Jan 2023 02:27)  atorvastatin 20 mg oral tablet: 1 tab(s) orally once a day (07 Jan 2023 02:26)  losartan-hydrochlorothiazide 50 mg-12.5 mg oral tablet: 1 tab(s) orally once a day (07 Jan 2023 02:27)  metFORMIN 500 mg oral tablet, extended release: 1 tab(s) orally once a day (07 Jan 2023 02:25)  oxybutynin 5 mg oral tablet: 1 tab(s) orally 2 times a day (07 Jan 2023 02:26)  pioglitazone 30 mg oral tablet: 1 tab(s) orally once a day (07 Jan 2023 02:25)  Trulicity Pen 0.75 mg/0.5 mL subcutaneous solution: 1 application subcutaneous once a week (07 Jan 2023 02:25)                           MEDICATIONS:  STANDING MEDICATIONS  acetaminophen     Tablet .. 650 milliGRAM(s) Oral every 6 hours  aspirin enteric coated 81 milliGRAM(s) Oral daily  atorvastatin 20 milliGRAM(s) Oral at bedtime  ceFAZolin   IVPB      ceFAZolin   IVPB 1000 milliGRAM(s) IV Intermittent every 8 hours  dextrose 5%. 1000 milliLiter(s) IV Continuous <Continuous>  dextrose 5%. 1000 milliLiter(s) IV Continuous <Continuous>  dextrose 50% Injectable 25 Gram(s) IV Push once  dextrose 50% Injectable 12.5 Gram(s) IV Push once  dextrose 50% Injectable 25 Gram(s) IV Push once  enoxaparin Injectable 40 milliGRAM(s) SubCutaneous every 24 hours  glucagon  Injectable 1 milliGRAM(s) IntraMuscular once  insulin lispro (ADMELOG) corrective regimen sliding scale   SubCutaneous Before meals and at bedtime  oxybutynin 5 milliGRAM(s) Oral two times a day  pantoprazole    Tablet 40 milliGRAM(s) Oral before breakfast  sodium chloride 0.9%. 1000 milliLiter(s) IV Continuous <Continuous>    PRN MEDICATIONS  dextrose Oral Gel 15 Gram(s) Oral once PRN                                           --------------------------------------------------------------  LABS:                        11.1   6.75  )-----------( 269      ( 12 Jan 2023 07:43 )             34.5     01-12    138  |  100  |  23<H>  ----------------------------<  96  4.5   |  30  |  0.8    Ca    9.1      12 Jan 2023 07:43  Phos  1.7     01-11  Mg     2.1     01-12    TPro  5.5<L>  /  Alb  3.1<L>  /  TBili  0.8  /  DBili  x   /  AST  28  /  ALT  17  /  AlkPhos  66  01-11    PT/INR - ( 11 Jan 2023 06:34 )   PT: 15.80 sec;   INR: 1.37 ratio    PTT - ( 11 Jan 2023 06:34 )  PTT:34.3 sec    Culture - Blood (collected 10 Stevie 2023 11:13)  Source: .Blood None  Preliminary Report (11 Jan 2023 23:01):    No growth to date.    CAPILLARY BLOOD GLUCOSE  POCT Blood Glucose.: 105 mg/dL (12 Jan 2023 07:22)  POCT Blood Glucose.: 129 mg/dL (11 Jan 2023 22:36)  POCT Blood Glucose.: 175 mg/dL (11 Jan 2023 21:07)  POCT Blood Glucose.: 144 mg/dL (11 Jan 2023 16:27)  POCT Blood Glucose.: 175 mg/dL (11 Jan 2023 11:44)                                            -------------------------------------------------------------  RADIOLOGY:    < from: Xray Foot AP + Lateral, Left (01.09.23 @ 17:49) >  IMPRESSION:  No acute fractures or dislocations. Degenerative changes of the forefoot,   midfoot, and hindfoot. Diffuse soft tissue swelling on the hindfoot   plantar aspect.    < from: Xray Knee AP + Lateral + Oblique 3 Views, Left (01.09.23 @ 13:07) >  IMPRESSION:  1.  No acute fracture identified.  2.  Large knee joint effusion.    < from: [facility] Duplex Lower Ext Vein Scan, Bilat (01.07.23 @ 17:08) >  IMPRESSION:  No evidence of deep venous thrombosis in either lower extremity.                                            --------------------------------------------------------------

## 2023-01-12 NOTE — PROGRESS NOTE ADULT - ATTENDING COMMENTS
82-year-old patient with Hx of HTN, DM, DL, presents with lightheadedness that she felt when she was moving from the bathroom to her room causing her to fall on her knees. She did not lose consciousness or hit her head. She's been complaining of cough and fever for the past 3-4 days , + sick contact (her grandson tested COVID+). She has decreased PO intake for 2 days    # Left knee pain and effusion likely due to osteoarthritis + trauma  XR knee, left (01/09): Large knee joint effusion; No fractures  cont pain meds and PT     #Fevers due to covid and Suspected thrombophlebitis, right arm PIV site  cont abx     #Covid +  positive on 1/6/2022, symptoms started on 1/2/2022  sp RDV x3d    #DM cont current management cont insulin per protocol   POCT Blood Glucose.: 134 mg/dL (01-12-23 @ 11:45)  POCT Blood Glucose.: 105 mg/dL (01-12-23 @ 07:22)  POCT Blood Glucose.: 129 mg/dL (01-11-23 @ 22:36)  POCT Blood Glucose.: 175 mg/dL (01-11-23 @ 21:07)    #DL, HTN cont meds    #Progress Note Handoff  Pending (specify): discharge planning , anticipate dc in am   Family discussion: alberto pt   Disposition: STR .

## 2023-01-13 ENCOUNTER — TRANSCRIPTION ENCOUNTER (OUTPATIENT)
Age: 83
End: 2023-01-13

## 2023-01-13 VITALS
SYSTOLIC BLOOD PRESSURE: 130 MMHG | DIASTOLIC BLOOD PRESSURE: 58 MMHG | HEART RATE: 69 BPM | RESPIRATION RATE: 18 BRPM | TEMPERATURE: 98 F

## 2023-01-13 LAB
ANION GAP SERPL CALC-SCNC: 12 MMOL/L — SIGNIFICANT CHANGE UP (ref 7–14)
BUN SERPL-MCNC: 19 MG/DL — SIGNIFICANT CHANGE UP (ref 10–20)
CALCIUM SERPL-MCNC: 8.8 MG/DL — SIGNIFICANT CHANGE UP (ref 8.4–10.5)
CHLORIDE SERPL-SCNC: 103 MMOL/L — SIGNIFICANT CHANGE UP (ref 98–110)
CO2 SERPL-SCNC: 27 MMOL/L — SIGNIFICANT CHANGE UP (ref 17–32)
CREAT SERPL-MCNC: 0.8 MG/DL — SIGNIFICANT CHANGE UP (ref 0.7–1.5)
EGFR: 74 ML/MIN/1.73M2 — SIGNIFICANT CHANGE UP
GLUCOSE BLDC GLUCOMTR-MCNC: 150 MG/DL — HIGH (ref 70–99)
GLUCOSE BLDC GLUCOMTR-MCNC: 87 MG/DL — SIGNIFICANT CHANGE UP (ref 70–99)
GLUCOSE SERPL-MCNC: 81 MG/DL — SIGNIFICANT CHANGE UP (ref 70–99)
HCT VFR BLD CALC: 33.5 % — LOW (ref 37–47)
HGB BLD-MCNC: 11 G/DL — LOW (ref 12–16)
MCHC RBC-ENTMCNC: 26.8 PG — LOW (ref 27–31)
MCHC RBC-ENTMCNC: 32.8 G/DL — SIGNIFICANT CHANGE UP (ref 32–37)
MCV RBC AUTO: 81.7 FL — SIGNIFICANT CHANGE UP (ref 81–99)
NRBC # BLD: 0 /100 WBCS — SIGNIFICANT CHANGE UP (ref 0–0)
PLATELET # BLD AUTO: 275 K/UL — SIGNIFICANT CHANGE UP (ref 130–400)
POTASSIUM SERPL-MCNC: 4 MMOL/L — SIGNIFICANT CHANGE UP (ref 3.5–5)
POTASSIUM SERPL-SCNC: 4 MMOL/L — SIGNIFICANT CHANGE UP (ref 3.5–5)
RBC # BLD: 4.1 M/UL — LOW (ref 4.2–5.4)
RBC # FLD: 13.5 % — SIGNIFICANT CHANGE UP (ref 11.5–14.5)
SODIUM SERPL-SCNC: 142 MMOL/L — SIGNIFICANT CHANGE UP (ref 135–146)
WBC # BLD: 6.64 K/UL — SIGNIFICANT CHANGE UP (ref 4.8–10.8)
WBC # FLD AUTO: 6.64 K/UL — SIGNIFICANT CHANGE UP (ref 4.8–10.8)

## 2023-01-13 PROCEDURE — 99239 HOSP IP/OBS DSCHRG MGMT >30: CPT

## 2023-01-13 RX ADMIN — Medication 650 MILLIGRAM(S): at 05:46

## 2023-01-13 RX ADMIN — Medication 650 MILLIGRAM(S): at 00:59

## 2023-01-13 RX ADMIN — Medication 81 MILLIGRAM(S): at 12:05

## 2023-01-13 RX ADMIN — ENOXAPARIN SODIUM 40 MILLIGRAM(S): 100 INJECTION SUBCUTANEOUS at 05:46

## 2023-01-13 RX ADMIN — Medication 100 MILLIGRAM(S): at 05:46

## 2023-01-13 RX ADMIN — Medication 650 MILLIGRAM(S): at 12:05

## 2023-01-13 RX ADMIN — Medication 5 MILLIGRAM(S): at 05:46

## 2023-01-13 RX ADMIN — PANTOPRAZOLE SODIUM 40 MILLIGRAM(S): 20 TABLET, DELAYED RELEASE ORAL at 05:52

## 2023-01-13 NOTE — DISCHARGE NOTE NURSING/CASE MANAGEMENT/SOCIAL WORK - NSDCPEFALRISK_GEN_ALL_CORE
For information on Fall & Injury Prevention, visit: [email or web address] OR  [email or web address] OR  https://www.cdc.gov/steadi/patient.html

## 2023-01-13 NOTE — PROGRESS NOTE ADULT - ASSESSMENT
82-year-old patient with Hx of HTN, DM, DL, presents with lightheadedness that she felt when she was moving from the bathroom to her room causing her to fall on her knees. She did not lose consciousness or hit her head. She's been complaining of cough and fever for the past 3-4 days , + sick contact (her grandson tested COVID+). She has decreased PO intake for 2 days.    #Fall  #Left knee effusion likely due to osteoarthritis + trauma  - XR knee, left (01/09): Large knee joint effusion; No fractures  - XR Foot 01/09: No acute fractures; diffuse soft tissue swelling; DJD  - Echo 01/09: 60-65% EF  - most likely orthostatic hypotension from dehydration, responded to iv fluids  - Pt was unable to ambulate with PT yesterday due to severe left knee pain  - s/p Ortho eval - recommend cold compress therapy, WBAT  - Per PT 1/10: Ambulation limited by L knee pain. Pt felt faint when standing so couldn't complete assessment. 8/10 pain when bearing weight to left knee. She does not have pain at rest.  - Cont PT  - Pain control  - Cont cold compress therapy    #Fevers  #Suspected thrombophlebitis, right arm PIV site  IV removed yesterday. Pt had fever yesterday evening to 102.8. Possibly from COVID, but PIV site erythematous, not tender  - Start IV cefazolin 1g q8h  - F/u Blood Cx 1/10  - ESR 95, .9  - Has been afebrile past 24h   - Possible ortho re-eval to r/o septic arthritis (low suspicion for now)  - If pt is free of fevers for 24h, can DC on vantin 400 BID x 7 days    #Covid+  - Positive test 1/6/2022, symptoms started on 1/2/2022  - S/p RDV x3d  - saturating well on RA. no wheezing on exam. No steroids for now  - no signs of bacterial pneumonia  - ID consult appreciated    #DM  #DL  - A1c: 5.5  - lipid profile WNL (except HDL: 37)  - Hold home pioglitazone, metformin, and trulicity  - Cont ISS. monitor FS (goal 140-180)  - cw Lipitor 20mg qHS    #HTN  - hold for now, should DC off antihypertensives    #Other  DVT PPX: Lovenox q24h  GI PPX: Protonix PO q24h  Diet: DASH/TLC, carb consistent  Activity: increase as tolerated  Code status: full code  Dispo: From home. To rehab, pending auth. 82-year-old patient with Hx of HTN, DM, DL, presents with lightheadedness that she felt when she was moving from the bathroom to her room causing her to fall on her knees. She did not lose consciousness or hit her head. She's been complaining of cough and fever for the past 3-4 days , + sick contact (her grandson tested COVID+). She has decreased PO intake for 2 days.    #Fall  #Left knee effusion likely due to osteoarthritis + trauma  - XR knee, left (01/09): Large knee joint effusion; No fractures  - XR Foot 01/09: No acute fractures; diffuse soft tissue swelling; DJD  - Echo 01/09: 60-65% EF  - most likely orthostatic hypotension from dehydration, responded to iv fluids  - Pt was unable to ambulate with PT yesterday due to severe left knee pain  - s/p Ortho eval - recommend cold compress therapy, WBAT  - Per PT 1/10: Ambulation limited by L knee pain. Pt felt faint when standing so couldn't complete assessment. 8/10 pain when bearing weight to left knee. She does not have pain at rest.  - Cont PT  - Pain control  - Cont cold compress therapy    #Fevers resolved  #Suspected thrombophlebitis, right arm PIV site  now stable, no need for cont of abx       #Covid+  - Positive test 1/6/2022, symptoms started on 1/2/2022  - S/p RDV x3d  - saturating well on RA. no wheezing on exam. No steroids for now  - no signs of bacterial pneumonia  - ID consult appreciated    #DM  POCT Blood Glucose.: 150 mg/dL (01-13-23 @ 12:00)  POCT Blood Glucose.: 87 mg/dL (01-13-23 @ 07:46)  POCT Blood Glucose.: 110 mg/dL (01-12-23 @ 22:01)  POCT Blood Glucose.: 111 mg/dL (01-12-23 @ 17:02)  cont insulin per protocol while inpt   change back to home meds on dc     - lipid profile WNL (except HDL: 37)  - Hold home pioglitazone, metformin, and trulicity  - Cont ISS. monitor FS (goal 140-180)  - cw Lipitor 20mg qHS    #HTN, DL   no need for bp meds, cont statin   HR: 56 (01-13-23 @ 10:59) (56 - 73)  BP: 112/56 (01-13-23 @ 05:00) (110/58 - 114/69)      #Other  DVT PPX: Lovenox q24h  GI PPX: Protonix PO q24h  Diet: DASH/TLC, carb consistent  Activity: increase as tolerated  Code status: full code  Dispo: From home. To rehab,

## 2023-01-13 NOTE — PROGRESS NOTE ADULT - SUBJECTIVE AND OBJECTIVE BOX
SAUD VILLAVICENCIO 82y Female  MRN#: [id number]  Hospital Day: 7d    Pt is currently admitted with the primary diagnosis of fall    HPI: This is an 82-year-old patient with Hx of HTN, DM, DL, presents with lightheadedness that she felt when she was moving from the bathroom to her room causing her to fall on her knees. She did not lose consciousness or hit her head. She's been complaining of cough and fever for the past 3-4 days , + sick contact (her grandson tested COVID+). She has decreased PO intake for 2 days.  Patient lives alone, ambulates independently but has a cane and a walker PRN. She denies SOB, chest pain, N/V/D, urinary sx or abnormal BMs.    In the ED vitals were stable. normal O2 sat on RA.  Patient was found to be COVID+. Labs notable for T bili 1.5. otherwise normal. normal trops.  EKG: NSR with PACs.  Xray pelvis negative  CXR pending read  CTH initially showed possible hemorrhage or mass lesion --> repeat head CT was negative    SUBJECTIVE  Overnight events: None  Subjective complaints: None. Patient reports that L knee swelling and pain is improving.    Present Today:   - Chu:  No [ x ], Yes [   ] : Indication:     - Type of IV Access:       .. CVC/Piccline:  No [ x ], Yes [   ] : Indication:       .. Midline: No [ x ], Yes [   ] : Indication:                                             ----------------------------------------------------------  OBJECTIVE    VITAL SIGNS: Last 24 Hours  T(C): 36.2 (13 Jan 2023 05:00), Max: 36.8 (12 Jan 2023 21:00)  T(F): 97.1 (13 Jan 2023 05:00), Max: 98.2 (12 Jan 2023 21:00)  HR: 66 (13 Jan 2023 05:00) (66 - 73)  BP: 112/56 (13 Jan 2023 05:00) (110/58 - 114/69)  BP(mean): --  RR: 18 (13 Jan 2023 05:00) (18 - 18)  SpO2: 98% (13 Jan 2023 08:20) (97% - 98%)      01-12-23 @ 07:01  -  01-13-23 @ 07:00  --------------------------------------------------------  IN: 0 mL / OUT: 400 mL / NET: -400 mL    PHYSICAL EXAM:  General: well-appearing in NAD.  HEENT: Normocephalic, nontraumatic. PERRL.  LUNGS: Clear to auscultation b/l. No wheezes, rales, or rhonchi.  HEART: RRR. No murmurs, rubs, or gallops.  ABDOMEN: Soft, nontender, nondistended. + bowel sounds.  EXT: Pulses palpable x 4. No lower extremity edema. L knee effusion, flexion to ~30 degrees.  NEURO: AAOx4.  SKIN: Warm, dry.    PAST MEDICAL & SURGICAL HISTORY                                            -----------------------------------------------------------  ALLERGIES:  No Known Allergies                                            ------------------------------------------------------------    HOME MEDICATIONS  Home Medications:  aspirin 81 mg oral delayed release tablet: 1 tab(s) orally once a day (07 Jan 2023 02:27)  atorvastatin 20 mg oral tablet: 1 tab(s) orally once a day (07 Jan 2023 02:26)  losartan-hydrochlorothiazide 50 mg-12.5 mg oral tablet: 1 tab(s) orally once a day (07 Jan 2023 02:27)  metFORMIN 500 mg oral tablet, extended release: 1 tab(s) orally once a day (07 Jan 2023 02:25)  oxybutynin 5 mg oral tablet: 1 tab(s) orally 2 times a day (07 Jan 2023 02:26)  pioglitazone 30 mg oral tablet: 1 tab(s) orally once a day (07 Jan 2023 02:25)  Trulicity Pen 0.75 mg/0.5 mL subcutaneous solution: 1 application subcutaneous once a week (07 Jan 2023 02:25)                           MEDICATIONS:  STANDING MEDICATIONS  acetaminophen     Tablet .. 650 milliGRAM(s) Oral every 6 hours  aspirin enteric coated 81 milliGRAM(s) Oral daily  atorvastatin 20 milliGRAM(s) Oral at bedtime  ceFAZolin   IVPB      ceFAZolin   IVPB 1000 milliGRAM(s) IV Intermittent every 8 hours  dextrose 5%. 1000 milliLiter(s) IV Continuous <Continuous>  dextrose 5%. 1000 milliLiter(s) IV Continuous <Continuous>  dextrose 50% Injectable 25 Gram(s) IV Push once  dextrose 50% Injectable 12.5 Gram(s) IV Push once  dextrose 50% Injectable 25 Gram(s) IV Push once  enoxaparin Injectable 40 milliGRAM(s) SubCutaneous every 24 hours  glucagon  Injectable 1 milliGRAM(s) IntraMuscular once  insulin lispro (ADMELOG) corrective regimen sliding scale   SubCutaneous Before meals and at bedtime  oxybutynin 5 milliGRAM(s) Oral two times a day  pantoprazole    Tablet 40 milliGRAM(s) Oral before breakfast  sodium chloride 0.9%. 1000 milliLiter(s) IV Continuous <Continuous>    PRN MEDICATIONS  dextrose Oral Gel 15 Gram(s) Oral once PRN                                           --------------------------------------------------------------  LABS:                        11.0   6.64  )-----------( 275      ( 13 Jan 2023 05:59 )             33.5     01-13    142  |  103  |  19  ----------------------------<  81  4.0   |  27  |  0.8    Ca    8.8      13 Jan 2023 05:59  Mg     2.1     01-12    Culture - Blood (collected 10 Stevie 2023 11:13)  Source: .Blood None  Preliminary Report (11 Jan 2023 23:01):    No growth to date.    CAPILLARY BLOOD GLUCOSE  POCT Blood Glucose.: 87 mg/dL (13 Jan 2023 07:46)  POCT Blood Glucose.: 110 mg/dL (12 Jan 2023 22:01)  POCT Blood Glucose.: 111 mg/dL (12 Jan 2023 17:02)  POCT Blood Glucose.: 134 mg/dL (12 Jan 2023 11:45)                                            -------------------------------------------------------------  RADIOLOGY:    < from: Xray Foot AP + Lateral, Left (01.09.23 @ 17:49) >  IMPRESSION:  No acute fractures or dislocations. Degenerative changes of the forefoot,   midfoot, and hindfoot. Diffuse soft tissue swelling on the hindfoot   plantar aspect.    < from: Xray Knee AP + Lateral + Oblique 3 Views, Left (01.09.23 @ 13:07) >  IMPRESSION:  1.  No acute fracture identified.  2.  Large knee joint effusion.    < from: [facility] Duplex Lower Ext Vein Scan, Bilat (01.07.23 @ 17:08) >  IMPRESSION:  No evidence of deep venous thrombosis in either lower extremity.                                            --------------------------------------------------------------

## 2023-01-13 NOTE — PROGRESS NOTE ADULT - ATTENDING COMMENTS
82-year-old patient with Hx of HTN, DM, DL, presents with lightheadedness that she felt when she was moving from the bathroom to her room causing her to fall on her knees. She did not lose consciousness or hit her head. She's been complaining of cough and fever for the past 3-4 days , + sick contact (her grandson tested COVID+). She has decreased PO intake for 2 days, now stable , feels better, pain controlled,   still has left knee swelling but not tender , able to ambulate     # Left knee pain and effusion likely due to osteoarthritis + trauma  XR knee, left (01/09): Large knee joint effusion; No fractures  cont pain meds and PT     #Fevers due to covid and Suspected thrombophlebitis, right arm PIV site  no need to cont abx     #Covid +  positive on 1/6/2022, symptoms started on 1/2/2022  sp RDV x3d    #DM resume home meds on dc     #DL, HTN  no need for pb meds at this time  cont statin    #Progress Note Handoff  Pending (specify): discharge to SNF   Family discussion: alberto pt   Disposition: STR .   time spent 35 min 82-year-old patient with Hx of HTN, DM, DL, presents with lightheadedness that she felt when she was moving from the bathroom to her room causing her to fall on her knees. She did not lose consciousness or hit her head. She's been complaining of cough and fever for the past 3-4 days , + sick contact (her grandson tested COVID+). She has decreased PO intake for 2 days, now stable , feels better, pain controlled,   still has left knee swelling but not tender , able to ambulate     # Left knee pain and effusion likely due to osteoarthritis + trauma  XR knee, left (01/09): Large knee joint effusion; No fractures  cont pain meds and PT     #Fevers due to covid and Suspected thrombophlebitis, right arm PIV site  no need to cont abx     #Covid +  positive on 1/6/2022, symptoms started on 1/2/2022  sp RDV x3d    #DM resume home meds on dc . dc pioglitazone since her a1c was low on admission and there is a possiblity that she has had hypoglycemia at home   monitor fs in [place]     #DL, HTN  no need for pb meds at this time  cont statin    #Progress Note Handoff  Pending (specify): discharge to SNF   Family discussion: alberto pt   Disposition: STR .   time spent 35 min

## 2023-01-13 NOTE — PROGRESS NOTE ADULT - REASON FOR ADMISSION
Fall s/p lightheadedness

## 2023-01-13 NOTE — DISCHARGE NOTE NURSING/CASE MANAGEMENT/SOCIAL WORK - PATIENT PORTAL LINK FT
You can access the FollowMyHealth Patient Portal offered by [facility] by registering at the following website: [email or web address]. By joining [email or web address]’s FollowMyHealth portal, you will also be able to view your health information using other applications (apps) compatible with our system.

## 2023-01-15 LAB
CULTURE RESULTS: SIGNIFICANT CHANGE UP
SPECIMEN SOURCE: SIGNIFICANT CHANGE UP

## 2023-01-16 PROBLEM — Z00.00 ENCOUNTER FOR PREVENTIVE HEALTH EXAMINATION: Noted: 2023-01-16

## 2023-01-25 NOTE — CDI QUERY NOTE - NSCDIOTHERTXTBX_GEN_ALL_CORE_HH
CLINICAL INDICATORS    1/10 Hospitalist Progress Note: …d/w pt regarding IV abx for suspected cellulitis… #Suspected thrombophlebitis, right arm PIV site. IV removed yesterday. Pt had fever yesterday evening to 102.8. Possibly from COVID, but PIV site erythematous, not tender. - Draw BCx today. - Start IV cefazolin 1g q8h…     1/13 Internal Medicine Resident/Attending Progress Note: …#Fevers due to covid and Suspected thrombophlebitis, right arm PIV site. no need to cont abx…    Lab Results:  • Sed Rate, Erythrocyte: 95 (1/11)  • CRP: 203.9 (1/11)    Orders:  • IV Ancef 1G Q8H ind: RUE cellulitis (1/10 – 1/13)      Based on your professional judgment and the clinical indicators please clarify if suspected cellulitis can be further specified as:  • Suspected right upper extremity cellulitis could not be ruled out at the time of discharge  • Suspected right upper extremity cellulitis was ruled out at the time of discharge  • Other (please specify):  • Clinically unable to determine

## 2023-01-28 DIAGNOSIS — M15.9 POLYOSTEOARTHRITIS, UNSPECIFIED: ICD-10-CM

## 2023-01-28 DIAGNOSIS — L03.113 CELLULITIS OF RIGHT UPPER LIMB: ICD-10-CM

## 2023-01-28 DIAGNOSIS — Z79.82 LONG TERM (CURRENT) USE OF ASPIRIN: ICD-10-CM

## 2023-01-28 DIAGNOSIS — R42 DIZZINESS AND GIDDINESS: ICD-10-CM

## 2023-01-28 DIAGNOSIS — I10 ESSENTIAL (PRIMARY) HYPERTENSION: ICD-10-CM

## 2023-01-28 DIAGNOSIS — E11.9 TYPE 2 DIABETES MELLITUS WITHOUT COMPLICATIONS: ICD-10-CM

## 2023-01-28 DIAGNOSIS — E86.0 DEHYDRATION: ICD-10-CM

## 2023-01-28 DIAGNOSIS — I80.8 PHLEBITIS AND THROMBOPHLEBITIS OF OTHER SITES: ICD-10-CM

## 2023-01-28 DIAGNOSIS — U07.1 COVID-19: ICD-10-CM

## 2023-01-28 DIAGNOSIS — Z79.84 LONG TERM (CURRENT) USE OF ORAL HYPOGLYCEMIC DRUGS: ICD-10-CM

## 2023-01-28 DIAGNOSIS — E78.5 HYPERLIPIDEMIA, UNSPECIFIED: ICD-10-CM

## 2023-01-28 DIAGNOSIS — I95.1 ORTHOSTATIC HYPOTENSION: ICD-10-CM

## 2023-01-28 DIAGNOSIS — M17.12 UNILATERAL PRIMARY OSTEOARTHRITIS, LEFT KNEE: ICD-10-CM

## 2023-01-28 DIAGNOSIS — E83.42 HYPOMAGNESEMIA: ICD-10-CM

## 2023-01-28 DIAGNOSIS — M25.462 EFFUSION, LEFT KNEE: ICD-10-CM

## 2023-02-10 ENCOUNTER — APPOINTMENT (OUTPATIENT)
Dept: ORTHOPEDIC SURGERY | Facility: CLINIC | Age: 83
End: 2023-02-10

## 2023-03-21 RX ORDER — LOSARTAN/HYDROCHLOROTHIAZIDE 100MG-25MG
1 TABLET ORAL
Qty: 0 | Refills: 0 | DISCHARGE

## 2023-03-21 RX ORDER — ATORVASTATIN CALCIUM 80 MG/1
1 TABLET, FILM COATED ORAL
Qty: 0 | Refills: 0 | DISCHARGE

## 2023-03-21 RX ORDER — METFORMIN HYDROCHLORIDE 850 MG/1
1 TABLET ORAL
Qty: 0 | Refills: 0 | DISCHARGE

## 2023-03-21 RX ORDER — ASPIRIN/CALCIUM CARB/MAGNESIUM 324 MG
1 TABLET ORAL
Qty: 0 | Refills: 0 | DISCHARGE

## 2023-03-21 RX ORDER — PIOGLITAZONE HYDROCHLORIDE 15 MG/1
1 TABLET ORAL
Qty: 0 | Refills: 0 | DISCHARGE

## 2023-03-21 RX ORDER — OXYBUTYNIN CHLORIDE 5 MG
1 TABLET ORAL
Qty: 0 | Refills: 0 | DISCHARGE

## 2023-06-23 ENCOUNTER — APPOINTMENT (OUTPATIENT)
Dept: ORTHOPEDIC SURGERY | Facility: CLINIC | Age: 83
End: 2023-06-23
Payer: MEDICARE

## 2023-06-23 PROCEDURE — 99213 OFFICE O/P EST LOW 20 MIN: CPT | Mod: 25

## 2023-06-23 PROCEDURE — 20610 DRAIN/INJ JOINT/BURSA W/O US: CPT | Mod: LT

## 2023-06-23 NOTE — PHYSICAL EXAM
[Left] : left knee [4___] : hamstring 4[unfilled]/5 [] : no extensor lag [[id number]] : flexion 120 degrees [de-identified] : extension 0 degrees

## 2023-06-23 NOTE — HISTORY OF PRESENT ILLNESS
[de-identified] : Patient is an 82 year old female here for follow-up left knee osteoarthritis.  Patient states overall her last cortisone injection helped greatly.  Patient states in January she had a fall and went to [facility] and x-rays were read as negative of left knee patient was told she has osteoarthritis.  Patient states since then she has been having a flareup of osteoarthritis left knee.  Patient is interested in repeat cortisone injection.  Denies numbness and tingling, denies instability.

## 2023-06-23 NOTE — DISCUSSION/SUMMARY
[de-identified] : Reviewed x-rays left knee SI no acute fractures, subluxations, dislocations.  Advanced osteoarthritis.  Discussed treatment options at this time including rest, ice/heat, range of motion excess, physical therapy, cortisone injection, total knee replaced surgery.  Patient interested in repeat cortisone injection.  Patient is a diabetic, morning glucose 110.\par \par Dexamethasone and Lidocaine  \par \par Anesthesia: ethyl chloride sprayed topically. Dexamethasone 10mg/1mL 2 cc.  \par \par Xylocaine 1%: 2 cc.  \par \par   \par \par Medication was injected in the LT knee after verbal consent using sterile preparation and technique. The risks, benefits, and alternatives to cortisone injection were explained in full to the patient. Risks outlined include but are not limited to infection, sepsis, bleeding, scarring, skin discoloration, temporary increase in pain, syncopal episode, failure to resolve symptoms, allergic reaction, symptom recurrence, and elevation of blood sugar in diabetics. Patient understood the risks. All questions were answered. After discussion of options, patient requested an injection. Oral informed consent was obtained and sterile prep was done of the injection site. Sterile technique was utilized for the procedure including the preparation of the solutions used for the injection. Patient tolerated the procedure well. Advised to ice the injection site this evening. \par \par Patient will follow-up in 4 months for evaluation.  Patient understands agrees to plan.

## 2023-10-20 ENCOUNTER — APPOINTMENT (OUTPATIENT)
Dept: ORTHOPEDIC SURGERY | Facility: CLINIC | Age: 83
End: 2023-10-20
Payer: MEDICARE

## 2023-10-20 DIAGNOSIS — M17.12 UNILATERAL PRIMARY OSTEOARTHRITIS, LEFT KNEE: ICD-10-CM

## 2023-10-20 PROCEDURE — 20610 DRAIN/INJ JOINT/BURSA W/O US: CPT | Mod: LT

## 2023-10-20 PROCEDURE — 99213 OFFICE O/P EST LOW 20 MIN: CPT | Mod: 25

## 2024-01-23 ENCOUNTER — APPOINTMENT (OUTPATIENT)
Dept: ORTHOPEDIC SURGERY | Facility: CLINIC | Age: 84
End: 2024-01-23

## 2024-03-06 ENCOUNTER — INPATIENT (INPATIENT)
Facility: HOSPITAL | Age: 84
LOS: 2 days | Discharge: HOME | DRG: 244 | End: 2024-03-09
Attending: INTERNAL MEDICINE | Admitting: STUDENT IN AN ORGANIZED HEALTH CARE EDUCATION/TRAINING PROGRAM
Payer: MEDICARE

## 2024-03-06 VITALS
TEMPERATURE: 98 F | SYSTOLIC BLOOD PRESSURE: 136 MMHG | OXYGEN SATURATION: 95 % | RESPIRATION RATE: 18 BRPM | HEART RATE: 78 BPM | DIASTOLIC BLOOD PRESSURE: 77 MMHG

## 2024-03-06 DIAGNOSIS — R55 SYNCOPE AND COLLAPSE: ICD-10-CM

## 2024-03-06 DIAGNOSIS — C50.919 MALIGNANT NEOPLASM OF UNSPECIFIED SITE OF UNSPECIFIED FEMALE BREAST: Chronic | ICD-10-CM

## 2024-03-06 DIAGNOSIS — Z98.49 CATARACT EXTRACTION STATUS, UNSPECIFIED EYE: Chronic | ICD-10-CM

## 2024-03-06 DIAGNOSIS — I42.1 OBSTRUCTIVE HYPERTROPHIC CARDIOMYOPATHY: Chronic | ICD-10-CM

## 2024-03-06 DIAGNOSIS — Z90.49 ACQUIRED ABSENCE OF OTHER SPECIFIED PARTS OF DIGESTIVE TRACT: Chronic | ICD-10-CM

## 2024-03-06 LAB
ALBUMIN SERPL ELPH-MCNC: 4.2 G/DL — SIGNIFICANT CHANGE UP (ref 3.5–5.2)
ALP SERPL-CCNC: 96 U/L — SIGNIFICANT CHANGE UP (ref 30–115)
ALT FLD-CCNC: 10 U/L — SIGNIFICANT CHANGE UP (ref 0–41)
ANION GAP SERPL CALC-SCNC: 15 MMOL/L — HIGH (ref 7–14)
AST SERPL-CCNC: 20 U/L — SIGNIFICANT CHANGE UP (ref 0–41)
BASOPHILS # BLD AUTO: 0.05 K/UL — SIGNIFICANT CHANGE UP (ref 0–0.2)
BASOPHILS NFR BLD AUTO: 0.4 % — SIGNIFICANT CHANGE UP (ref 0–1)
BILIRUB SERPL-MCNC: 1.7 MG/DL — HIGH (ref 0.2–1.2)
BUN SERPL-MCNC: 26 MG/DL — HIGH (ref 10–20)
CALCIUM SERPL-MCNC: 9.8 MG/DL — SIGNIFICANT CHANGE UP (ref 8.4–10.5)
CHLORIDE SERPL-SCNC: 104 MMOL/L — SIGNIFICANT CHANGE UP (ref 98–110)
CO2 SERPL-SCNC: 22 MMOL/L — SIGNIFICANT CHANGE UP (ref 17–32)
CREAT SERPL-MCNC: 0.8 MG/DL — SIGNIFICANT CHANGE UP (ref 0.7–1.5)
EGFR: 74 ML/MIN/1.73M2 — SIGNIFICANT CHANGE UP
EOSINOPHIL # BLD AUTO: 0.19 K/UL — SIGNIFICANT CHANGE UP (ref 0–0.7)
EOSINOPHIL NFR BLD AUTO: 1.5 % — SIGNIFICANT CHANGE UP (ref 0–8)
GLUCOSE SERPL-MCNC: 106 MG/DL — HIGH (ref 70–99)
HCT VFR BLD CALC: 38.9 % — SIGNIFICANT CHANGE UP (ref 37–47)
HGB BLD-MCNC: 12.8 G/DL — SIGNIFICANT CHANGE UP (ref 12–16)
IMM GRANULOCYTES NFR BLD AUTO: 0.3 % — SIGNIFICANT CHANGE UP (ref 0.1–0.3)
LYMPHOCYTES # BLD AUTO: 0.94 K/UL — LOW (ref 1.2–3.4)
LYMPHOCYTES # BLD AUTO: 7.4 % — LOW (ref 20.5–51.1)
MCHC RBC-ENTMCNC: 27.4 PG — SIGNIFICANT CHANGE UP (ref 27–31)
MCHC RBC-ENTMCNC: 32.9 G/DL — SIGNIFICANT CHANGE UP (ref 32–37)
MCV RBC AUTO: 83.1 FL — SIGNIFICANT CHANGE UP (ref 81–99)
MONOCYTES # BLD AUTO: 0.8 K/UL — HIGH (ref 0.1–0.6)
MONOCYTES NFR BLD AUTO: 6.3 % — SIGNIFICANT CHANGE UP (ref 1.7–9.3)
NEUTROPHILS # BLD AUTO: 10.68 K/UL — HIGH (ref 1.4–6.5)
NEUTROPHILS NFR BLD AUTO: 84.1 % — HIGH (ref 42.2–75.2)
NRBC # BLD: 0 /100 WBCS — SIGNIFICANT CHANGE UP (ref 0–0)
PLATELET # BLD AUTO: 228 K/UL — SIGNIFICANT CHANGE UP (ref 130–400)
PMV BLD: 10.5 FL — HIGH (ref 7.4–10.4)
POTASSIUM SERPL-MCNC: 4.2 MMOL/L — SIGNIFICANT CHANGE UP (ref 3.5–5)
POTASSIUM SERPL-SCNC: 4.2 MMOL/L — SIGNIFICANT CHANGE UP (ref 3.5–5)
PROT SERPL-MCNC: 7.1 G/DL — SIGNIFICANT CHANGE UP (ref 6–8)
RBC # BLD: 4.68 M/UL — SIGNIFICANT CHANGE UP (ref 4.2–5.4)
RBC # FLD: 14.9 % — HIGH (ref 11.5–14.5)
SODIUM SERPL-SCNC: 141 MMOL/L — SIGNIFICANT CHANGE UP (ref 135–146)
TROPONIN T, HIGH SENSITIVITY RESULT: 25 NG/L — HIGH (ref 6–13)
TROPONIN T, HIGH SENSITIVITY RESULT: 25 NG/L — HIGH (ref 6–13)
WBC # BLD: 12.7 K/UL — HIGH (ref 4.8–10.8)
WBC # FLD AUTO: 12.7 K/UL — HIGH (ref 4.8–10.8)

## 2024-03-06 PROCEDURE — 85025 COMPLETE CBC W/AUTO DIFF WBC: CPT

## 2024-03-06 PROCEDURE — 99285 EMERGENCY DEPT VISIT HI MDM: CPT

## 2024-03-06 PROCEDURE — C1898: CPT

## 2024-03-06 PROCEDURE — 99222 1ST HOSP IP/OBS MODERATE 55: CPT

## 2024-03-06 PROCEDURE — 93280 PM DEVICE PROGR EVAL DUAL: CPT

## 2024-03-06 PROCEDURE — 85610 PROTHROMBIN TIME: CPT

## 2024-03-06 PROCEDURE — 85730 THROMBOPLASTIN TIME PARTIAL: CPT

## 2024-03-06 PROCEDURE — 84481 FREE ASSAY (FT-3): CPT

## 2024-03-06 PROCEDURE — 84439 ASSAY OF FREE THYROXINE: CPT

## 2024-03-06 PROCEDURE — 72125 CT NECK SPINE W/O DYE: CPT | Mod: 26,MC

## 2024-03-06 PROCEDURE — 70486 CT MAXILLOFACIAL W/O DYE: CPT | Mod: 26,MC

## 2024-03-06 PROCEDURE — 81003 URINALYSIS AUTO W/O SCOPE: CPT

## 2024-03-06 PROCEDURE — 84443 ASSAY THYROID STIM HORMONE: CPT

## 2024-03-06 PROCEDURE — 80053 COMPREHEN METABOLIC PANEL: CPT

## 2024-03-06 PROCEDURE — 82962 GLUCOSE BLOOD TEST: CPT

## 2024-03-06 PROCEDURE — 72170 X-RAY EXAM OF PELVIS: CPT | Mod: 26

## 2024-03-06 PROCEDURE — 71045 X-RAY EXAM CHEST 1 VIEW: CPT

## 2024-03-06 PROCEDURE — 84100 ASSAY OF PHOSPHORUS: CPT

## 2024-03-06 PROCEDURE — 70450 CT HEAD/BRAIN W/O DYE: CPT | Mod: 26,MC

## 2024-03-06 PROCEDURE — C1785: CPT

## 2024-03-06 PROCEDURE — 83735 ASSAY OF MAGNESIUM: CPT

## 2024-03-06 PROCEDURE — 73564 X-RAY EXAM KNEE 4 OR MORE: CPT | Mod: 26,LT

## 2024-03-06 PROCEDURE — 71046 X-RAY EXAM CHEST 2 VIEWS: CPT

## 2024-03-06 PROCEDURE — 83036 HEMOGLOBIN GLYCOSYLATED A1C: CPT

## 2024-03-06 PROCEDURE — 36415 COLL VENOUS BLD VENIPUNCTURE: CPT

## 2024-03-06 PROCEDURE — 71045 X-RAY EXAM CHEST 1 VIEW: CPT | Mod: 26

## 2024-03-06 PROCEDURE — 80048 BASIC METABOLIC PNL TOTAL CA: CPT

## 2024-03-06 PROCEDURE — 87086 URINE CULTURE/COLONY COUNT: CPT

## 2024-03-06 RX ORDER — DEXTROSE 50 % IN WATER 50 %
25 SYRINGE (ML) INTRAVENOUS ONCE
Refills: 0 | Status: DISCONTINUED | OUTPATIENT
Start: 2024-03-06 | End: 2024-03-09

## 2024-03-06 RX ORDER — ATORVASTATIN CALCIUM 80 MG/1
20 TABLET, FILM COATED ORAL AT BEDTIME
Refills: 0 | Status: DISCONTINUED | OUTPATIENT
Start: 2024-03-06 | End: 2024-03-09

## 2024-03-06 RX ORDER — LANOLIN ALCOHOL/MO/W.PET/CERES
3 CREAM (GRAM) TOPICAL AT BEDTIME
Refills: 0 | Status: DISCONTINUED | OUTPATIENT
Start: 2024-03-06 | End: 2024-03-09

## 2024-03-06 RX ORDER — EMPAGLIFLOZIN 10 MG/1
1 TABLET, FILM COATED ORAL
Refills: 0 | DISCHARGE

## 2024-03-06 RX ORDER — SODIUM CHLORIDE 9 MG/ML
1000 INJECTION, SOLUTION INTRAVENOUS
Refills: 0 | Status: DISCONTINUED | OUTPATIENT
Start: 2024-03-06 | End: 2024-03-09

## 2024-03-06 RX ORDER — DEXTROSE 50 % IN WATER 50 %
12.5 SYRINGE (ML) INTRAVENOUS ONCE
Refills: 0 | Status: DISCONTINUED | OUTPATIENT
Start: 2024-03-06 | End: 2024-03-09

## 2024-03-06 RX ORDER — DULAGLUTIDE 4.5 MG/.5ML
1 INJECTION, SOLUTION SUBCUTANEOUS
Qty: 0 | Refills: 0 | DISCHARGE

## 2024-03-06 RX ORDER — ASPIRIN/CALCIUM CARB/MAGNESIUM 324 MG
81 TABLET ORAL DAILY
Refills: 0 | Status: DISCONTINUED | OUTPATIENT
Start: 2024-03-06 | End: 2024-03-09

## 2024-03-06 RX ORDER — INSULIN LISPRO 100/ML
VIAL (ML) SUBCUTANEOUS
Refills: 0 | Status: DISCONTINUED | OUTPATIENT
Start: 2024-03-06 | End: 2024-03-09

## 2024-03-06 RX ORDER — ENOXAPARIN SODIUM 100 MG/ML
40 INJECTION SUBCUTANEOUS EVERY 24 HOURS
Refills: 0 | Status: DISCONTINUED | OUTPATIENT
Start: 2024-03-06 | End: 2024-03-08

## 2024-03-06 RX ORDER — GLUCAGON INJECTION, SOLUTION 0.5 MG/.1ML
1 INJECTION, SOLUTION SUBCUTANEOUS ONCE
Refills: 0 | Status: DISCONTINUED | OUTPATIENT
Start: 2024-03-06 | End: 2024-03-09

## 2024-03-06 RX ORDER — LOSARTAN POTASSIUM 100 MG/1
50 TABLET, FILM COATED ORAL DAILY
Refills: 0 | Status: DISCONTINUED | OUTPATIENT
Start: 2024-03-06 | End: 2024-03-09

## 2024-03-06 RX ORDER — OXYBUTYNIN CHLORIDE 5 MG
5 TABLET ORAL
Refills: 0 | Status: DISCONTINUED | OUTPATIENT
Start: 2024-03-06 | End: 2024-03-09

## 2024-03-06 RX ORDER — PIOGLITAZONE HYDROCHLORIDE 15 MG/1
1 TABLET ORAL
Refills: 0 | DISCHARGE

## 2024-03-06 RX ORDER — ACETAMINOPHEN 500 MG
650 TABLET ORAL EVERY 6 HOURS
Refills: 0 | Status: DISCONTINUED | OUTPATIENT
Start: 2024-03-06 | End: 2024-03-09

## 2024-03-06 RX ORDER — DEXTROSE 50 % IN WATER 50 %
15 SYRINGE (ML) INTRAVENOUS ONCE
Refills: 0 | Status: DISCONTINUED | OUTPATIENT
Start: 2024-03-06 | End: 2024-03-09

## 2024-03-06 NOTE — H&P ADULT - NSHPPHYSICALEXAM_GEN_ALL_CORE
PHYSICAL EXAM:  GENERAL: No acute distress, well-developed  HEAD:  red abrasion over right forehead to outer eye lid, Normocephalic, poor dentition  EYES: EOMI, PERRLA, conjunctiva and sclera clear  NECK: Supple, no lymphadenopathy, no JVD  CHEST/LUNG: CTAB; No wheezes, rales, or rhonchi  HEART: Regular rate and rhythm; No murmurs, rubs, or gallops  ABDOMEN: Soft, non-tender, non-distended; normal bowel sounds, no organomegaly  EXTREMITIES:  2+ peripheral pulses b/l, No clubbing, cyanosis, or edema  NEUROLOGY: A&O x 3, no focal deficits  SKIN: No rashes or lesions

## 2024-03-06 NOTE — ED ADULT TRIAGE NOTE - CHIEF COMPLAINT QUOTE
syncopal episode, while walking today, hit face on box on the floor., having knee pain. scheduled for pacemaker placement next week

## 2024-03-06 NOTE — ED PROVIDER NOTE - CLINICAL SUMMARY MEDICAL DECISION MAKING FREE TEXT BOX
83-year-old female with history of HTN, HLD, chronic L knee pain, in ER after episode of syncope.  Patient states she was having L knee pain, became lightheaded and nauseated, had episode of syncope, hit head.  Patient was previously evaluated for syncope 1 month ago, has been following with EP Dr. La, scheduled for PPM placement next week for symptomatic bradycardia.  Patient denies any CP/SOB.  Not currently feeling lightheaded or dizzy.  No HA. No abdominal pain.  No F/C.  No calf pain/swelling.  L knee pain at baseline.  PE - nad, nc, + superficial abrasions to forehead, eomi, perrl, op - clear, mmm, neck supple, cta b/l, no w/r/r, rrr, abd- soft, nt/nd, nabs, from x 4, no LE swelling/tenderness, A&O x 3, cn 2-12 intact, motor 5/5 b/l UE and LE, sensation intact x 4  -Labs reviewed: trop 25 > 25 on repeat.  EKG: NSR@80, NAD, TWI V5/V6, downsloping ST segments in I/aVL, QTc 436.  CXR negative.  CT head/facial bones/C-spine negative for traumatic injury.  Patient admitted to telemetry for further cardiac evaluation.

## 2024-03-06 NOTE — ED PROVIDER NOTE - OBJECTIVE STATEMENT
Patient is a 83y Female no PMH HTN, HLD, DM, s/p cholecystectomy who presents to the ED s/p syncopal episode PTA. Patient reports she was standing up and began feeling lightheaded and nauseous when she syncopized. Patient's son heard her fall and found her face down sprawled out on the floor, she awoke almost immediately. Patient reports previous episode of syncope 1 month ago, seen by Dr. Lan and Dr. La and found to have symptomatic bradycardia; scheduled for pacemaker implantation in 1 week. Denies fever, chills, CP, SOB, visual changes, dizziness, headache, N/V/D, abdominal pain, or urinary sx.

## 2024-03-06 NOTE — H&P ADULT - NSICDXFAMILYHX_GEN_ALL_CORE_FT
FAMILY HISTORY:  Mother  Still living? Unknown  FH: breast cancer, Age at diagnosis: Age Unknown    Child  Still living? Yes, Estimated age: 35  Family history of hypertrophic cardiomyopathy, Age at diagnosis: Age Unknown

## 2024-03-06 NOTE — H&P ADULT - HISTORY OF PRESENT ILLNESS
Patient is a 83y Female with PMH of HTN, HLD, DM, s/p cholecystectomy who presents to the ED s/p syncopal episode. Patient reports she was walking in bedroom when she felt lightheaded and nauseous then syncopized. She fell hitting her head on a cardboard box. Patient's son heard the fall and found her on the floor, reported patient awoke almost immediately. Patient recalls the event and does not report any confusion post event. Denies any bowel/bladder incontinence, no tongue biting. Patient is being followed by cardiology as she had a syncopal event last month, MCOT was placed for 2 weeks showing a pause, follows with Dr. Lan and Dr. La, found to have symptomatic bradycardia and was scheduled for pacemaker next Tuesday. Currently patient is feeling well and denies any fever, chills, chest pain, SOB, visual changes, dizziness, headache, N/V/D, abdominal pain, or urinary sx.    In the ED, afebrile 97.8F, HR 61-78, /63, satting 99% on RA. Labs showed WBC 12.7, trop 25--> 25, CT head and neck no acute, EKG: Left ventricular hypertrophy with repolarization abnormality ( R in aVL ). Cardiology was consulted for syncope and pauses on  MCOT previously, recommending EP for PPM placement. Patient is admitted to [facility] for management of syncope/pacemaker implantation.

## 2024-03-06 NOTE — H&P ADULT - ATTENDING COMMENTS
Medicine Attending Addendum  Patient was seen and examined with medicine team.  Nursing records reviewed. I agree with the resident/PA/NP's note including past medical history, home medications, social history, allergies, surgical history, family history, and review of system. I have reviewed relevant vitals, laboratory values, imaging studies, and microbiology.     Briefly, Patient is a 83y Female with PMH of HTN, HLD, DM, s/p cholecystectomy who presents to the ED s/p syncopal episode. Patient reports she was walking in bedroom when she felt lightheaded and nauseous then syncopized, hitting her head on a cardboard box. Patient's son heard the fall and found her on the floor, reported patient awoke almost immediately. Patient recalls the event and does not report any confusion post event. Denies any bowel/bladder incontinence, no tongue biting. Patient is being followed by cardiology as she had a syncopal event last month, follows with Dr. Lan and Dr. La, found to have symptomatic bradycardia and was scheduled for pacemaker next tuesday. In the ED, VSS, trop 25--> 25, CT head/neck negative for acute pathology, EKG LVH with repolarization abnormality. Cardiology was consulted for syncope and pauses on  MCOT previously, recommending EP for PPM placement. Patient admitted to med/tele.     #Fall - likely cardiac in nature  #Pause on MCOT  - CT head No acute intracranial pathology or hemorrhage  - CT cervical spin no acute fracture, multilevel degenerative changes  - EKG wiht LVH with repolarization abnormality R in AVL  - trop 25--> 25  - C/w telemetry monitoring  - Cardiology consult appreciated  - Patient with pause on MCOT, originally planned for PPM next week  - EP consult for PPM eval  - NPO at midnight for potential to follow up with EP in AM    #DM  #DL  - hold home pioglitazone, metformin  - started on SS. monitor FS (goal 140-180)  - cw statin    #HTN  - cw home losartan and HCTZ    #Incidental Findings:  - CT: severe spinal stenosis as well as severe bilateral foraminal narrowing at At C4-C7*s  - Given patient is asymptomatic, outpatient follow up with PMD.    - rest of A/P as per detailed housestaff note above except above modifications     Seen and evaluated patient on 03/06/2024

## 2024-03-06 NOTE — ED PROVIDER NOTE - PROGRESS NOTE DETAILS
CR: Spoke with EP NP who advised me to place consult for EP, will see patient tomorrow once admitted. CR: Spoke to patient and son about admission, both agreeable. Endorsed to MAR and will admit to med/tele.

## 2024-03-06 NOTE — ED PROVIDER NOTE - PHYSICAL EXAMINATION
[facility] CARDIOLOGY PROGRESS NOTE           2/22/2018 7:21 AM    Admit Date: 2/20/2018    Admit Diagnosis: afib/rvr;A-fib ([address] Atrial fibrillation (HCC);A-FIB      Subjective:   Patient has been cardiac stable after Av Node Ablation    Objective:     Vitals:    02/21/18 2034 02/21/18 2045 02/22/18 0028 02/22/18 0456   BP: (!) 82/56 100/60 111/69 129/78   Pulse: 75  75 77   Resp: 17 18 17 18   Temp: 98.7 °F (37.1 °C)  98.4 °F (36.9 °C) 97.8 °F (36.6 °C)   SpO2: 99% 97% 98% 98%   Weight:    150 lb 14.4 oz (68.4 kg)   Height:           Physical Exam:  General-Well Developed, Well Nourished, No Acute Distress, Alert & Oriented x 3, appropriate mood. Neck- supple, no JVD  CV- irregular rate and rhythm no MRG  Lung- clear bilaterally  Abd- soft, nontender, nondistended  Ext- no edema bilaterally.   Skin- warm and dry    Current Facility-Administered Medications   Medication Dose Route Frequency    sodium chloride (NS) flush 5-10 mL  5-10 mL IntraVENous Q8H    sodium chloride (NS) flush 5-10 mL  5-10 mL IntraVENous PRN    acetaminophen (TYLENOL) tablet 650 mg  650 mg Oral Q4H PRN    oxyCODONE-acetaminophen (PERCOCET) 5-325 mg per tablet 1 Tab  1 Tab Oral Q4H PRN    [START ON 2/23/2018] warfarin (COUMADIN) tablet 2.5 mg  2.5 mg Oral Once per day on Mon Wed Fri    warfarin (COUMADIN) tablet 5 mg  5 mg Oral Once per day on Sun Tue Thu Sat    acetaminophen (TYLENOL) tablet 500 mg  500 mg Oral Q6H PRN    albuterol (PROVENTIL VENTOLIN) nebulizer solution 1.25 mg  1.25 mg Nebulization Q6H PRN    metoprolol tartrate (LOPRESSOR) tablet 25 mg  25 mg Oral BID WITH MEALS    montelukast (SINGULAIR) tablet 10 mg  10 mg Oral DAILY    nitroglycerin (NITROSTAT) tablet 0.4 mg  0.4 mg SubLINGual Q5MIN PRN    pantoprazole (PROTONIX) tablet 40 mg  40 mg Oral ACB    senna-docusate (PERICOLACE) 8.6-50 mg per tablet 1 Tab  1 Tab Oral DAILY    sodium chloride (NS) flush 5-10 mL  5-10 mL IntraVENous Q8H    sodium chloride (NS) flush 5-10 mL  5-10 mL IntraVENous PRN    dilTIAZem (CARDIZEM) IR tablet 60 mg  60 mg Oral TIDAC    budesonide (PULMICORT) 500 mcg/2 ml nebulizer suspension  500 mcg Nebulization BID RT    And    albuterol CONCENTRATE 2.5mg/0.5 mL neb soln  2.5 mg Nebulization Q6H RT     Data Review:   Recent Results (from the past 24 hour(s))   PLEASE READ & DOCUMENT PPD TEST IN 48 HRS    Collection Time: 02/21/18  5:00 PM   Result Value Ref Range    PPD Negative Negative    mm Induration 0 mm   EKG, 12 LEAD, INITIAL    Collection Time: 02/21/18  6:39 PM   Result Value Ref Range    Ventricular Rate 75 BPM    Atrial Rate 75 BPM    QRS Duration 142 ms    Q-T Interval 426 ms    QTC Calculation (Bezet) 475 ms    Calculated R Axis -98 degrees    Calculated T Axis 16 degrees    Diagnosis       Electronic ventricular pacemaker  When compared with ECG of 20-FEB-2018 17:03,  Electronic ventricular pacemaker has replaced Atrial fibrillation  Vent. rate has decreased BY  59 BPM  Confirmed by ST TIKI ROBB MD (), MIMI MARIE ([id number]) on 2/21/2018 8:49:27 PM     PROTHROMBIN TIME + INR    Collection Time: 02/22/18  3:53 AM   Result Value Ref Range    Prothrombin time 22.3 (H) 11.5 - 14.5 sec    INR 2.0       Assessment:     Principal Problem:    Permanent atrial fibrillation ([facility] (10/20/2015)      Overview: controlled    Active Problems:    COPD (chronic obstructive pulmonary disease) ([facility] [address] (10/20/2015)      HTN (hypertension) (10/20/2015)      Chronic diastolic heart failure (HCC) (2/20/2018)      A-fib ([facility] [address] (2/20/2018)      Atrial fibrillation ([facility] (2/21/2018)      Plan:   1. A. Fib with RVR - Failed medical therapy - Post Av Node Ablation. Stop Dilt. On Coumadin  2. Discharge Planning - Will need Rehab/placement - SW consulted    Gaby Son. Ruth Ann Aguialr M.D., F.A.C.C, F.H.R.S.   Cardiology/Electrophysiology VITAL SIGNS: I have reviewed nursing notes and confirm.  CONSTITUTIONAL: In no acute distress.  SKIN: Skin exam is warm and dry. Linear superficial abrasion to the right forehead, not bleeding.   HEAD: Normocephalic; atraumatic.  EYES: PERRL, EOM intact; conjunctiva and sclera clear.  ENT: No nasal discharge; airway clear.   NECK: Supple; non tender. No spinal tenderness throughout.   CARD: S1, S2; Regular rate and rhythm.  RESP: No wheezes, rales or rhonchi. Speaking in full sentences.   ABD: No ecchymosis of the trunk. Normal bowel sounds; soft; non-distended; non-tender; No rebound or guarding. No CVA tenderness.  EXT: Normal ROM. Chronic pain and swelling of left knee 2/2 severe arthritis.   NEURO: Alert, oriented. Grossly unremarkable. No focal deficits. Strength and sensation equal throughout. (-) pronator drift.

## 2024-03-06 NOTE — H&P ADULT - ASSESSMENT
Patient is a 83y Female with PMH of HTN, HLD, DM, s/p cholecystectomy who presents to the ED s/p syncopal episode. Patient reports she was walking in bedroom when she felt lightheaded and nauseous then syncopized, hitting her head on a cardboard box. Patient's son heard the fall and found her on the floor, reported patient awoke almost immediately. Patient recalls the event and does not report any confusion post event. Denies any bowel/bladder incontinence, no tongue biting. Patient is being followed by cardiology as she had a syncopal event last month, follows with Dr. Lan and Dr. La, found to have symptomatic bradycardia and was scheduled for pacemaker next tuesday. In the ED, VSS, trop 25--> 25, CT head/neck negative for acute pathology, EKG LVH with repolarization abnormality. Cardiology was consulted for syncope and pauses on  MCOT previously, recommending EP for PPM placement. Patient admitted to med/tele.       #Fall - likely cardiac in nature  #Pause on MCOT  - CT head No acute intracranial pathology or hemorrhage  - CT cervical spin no acute fracture, multilevel degenerative changes  - EKG wiht LVH with repolarization abnormality R in AVL  - trop 25--> 25  - C/w telemetry monitoring  - Cardiology consult appreciated  - Patient with pause on MCOT, originally planned for PPM next week  - EP consult for PPM eval     #DM  #DL  - hold home pioglitazone, metformin  - started on SS. monitor FS (goal 140-180)  - cw statin    #HTN  - cw home losartan and HCTZ    #MISC  - DVT PPx: Lovenox 40 qd  - GI PPx: not indicated  - Diet: DASH, Carbs restricted, low salt  - Activity; IAT  - Dispo: Med tele   Patient is a 83y Female with PMH of HTN, HLD, DM, s/p cholecystectomy who presents to the ED s/p syncopal episode. Patient reports she was walking in bedroom when she felt lightheaded and nauseous then syncopized, hitting her head on a cardboard box. Patient's son heard the fall and found her on the floor, reported patient awoke almost immediately. Patient recalls the event and does not report any confusion post event. Denies any bowel/bladder incontinence, no tongue biting. Patient is being followed by cardiology as she had a syncopal event last month, follows with Dr. Lan and Dr. La, found to have symptomatic bradycardia and was scheduled for pacemaker next tuesday. In the ED, VSS, trop 25--> 25, CT head/neck negative for acute pathology, EKG LVH with repolarization abnormality. Cardiology was consulted for syncope and pauses on  MCOT previously, recommending EP for PPM placement. Patient admitted to med/tele.       #Fall - likely cardiac in nature  #Pause on MCOT  - CT head No acute intracranial pathology or hemorrhage  - CT cervical spin no acute fracture, multilevel degenerative changes  - EKG wiht LVH with repolarization abnormality R in AVL  - trop 25--> 25  - C/w telemetry monitoring  - Cardiology consult appreciated  - Patient with pause on MCOT, originally planned for PPM next week  - EP consult for PPM eval  - NPO at midnight for potential to follow up with EP in AM    #DM  #DL  - hold home pioglitazone, metformin  - started on SS. monitor FS (goal 140-180)  - cw statin    #HTN  - cw home losartan and HCTZ    Med Rec was confirmed with [facility]. Pleas call [facility] in AM to confirm patient was not certain on Losartan and antidiabetic regimen.    #MISC  - DVT PPx: Lovenox 40 qd  - GI PPx: not indicated  - Diet: DASH, Carbs restricted, low salt  - Activity; IAT  - Dispo: Med tele

## 2024-03-06 NOTE — H&P ADULT - NSHPLABSRESULTS_GEN_ALL_CORE
.  LABS:                         12.8   12.70 )-----------( 228      ( 06 Mar 2024 16:06 )             38.9     03-06    141  |  104  |  26<H>  ----------------------------<  106<H>  4.2   |  22  |  0.8    Ca    9.8      06 Mar 2024 16:06    TPro  7.1  /  Alb  4.2  /  TBili  1.7<H>  /  DBili  x   /  AST  20  /  ALT  10  /  AlkPhos  96  03-06      Urinalysis Basic - ( 06 Mar 2024 16:06 )    Color: x / Appearance: x / SG: x / pH: x  Gluc: 106 mg/dL / Ketone: x  / Bili: x / Urobili: x   Blood: x / Protein: x / Nitrite: x   Leuk Esterase: x / RBC: x / WBC x   Sq Epi: x / Non Sq Epi: x / Bacteria: x                RADIOLOGY, EKG & ADDITIONAL TESTS: Reviewed.

## 2024-03-06 NOTE — CONSULT NOTE ADULT - SUBJECTIVE AND OBJECTIVE BOX
Patient is a 83y old  Female who presents with a chief complaint of     HPI:      PAST MEDICAL & SURGICAL HISTORY:      PREVIOUS DIAGNOSTIC TESTING:      ECHO  FINDINGS:    STRESS  FINDINGS:    CATHETERIZATION  FINDINGS:    MEDICATIONS  (STANDING):    MEDICATIONS  (PRN):      FAMILY HISTORY:      SOCIAL HISTORY:  CIGARETTES:    ALCOHOL:    REVIEW OF SYSTEMS:  CONSTITUTIONAL: No fever, weight loss, or fatigue  NECK: No pain or stiffness  RESPIRATORY: No cough, wheezing, chills or hemoptysis; No shortness of breath  CARDIOVASCULAR: No chest pain, palpitations, dizziness, or leg swelling  GASTROINTESTINAL: No abdominal or epigastric pain. No nausea, vomiting, or hematemesis; No diarrhea or constipation. No melena or hematochezia.  GENITOURINARY: No dysuria, frequency, hematuria, or incontinence  NEUROLOGICAL: No headaches, memory loss, loss of strength, numbness, or tremors  SKIN: No itching, burning, rashes, or lesions   ENDOCRINE: No heat or cold intolerance; No hair loss  MUSCULOSKELETAL: No joint pain or swelling; No muscle, back, or extremity pain  HEME/LYMPH: No easy bruising, or bleeding gums          Vital Signs Last 24 Hrs  T(C): 36.6 (06 Mar 2024 14:12), Max: 36.6 (06 Mar 2024 14:12)  T(F): 97.8 (06 Mar 2024 14:12), Max: 97.8 (06 Mar 2024 14:12)  HR: 78 (06 Mar 2024 14:12) (78 - 78)  BP: 136/77 (06 Mar 2024 14:12) (136/77 - 136/77)  BP(mean): --  RR: 18 (06 Mar 2024 14:12) (18 - 18)  SpO2: 95% (06 Mar 2024 14:12) (95% - 95%)    Parameters below as of 06 Mar 2024 14:12  Patient On (Oxygen Delivery Method): room air            PHYSICAL EXAM:  GENERAL: NAD, well-groomed, well-developed  HEAD:  Atraumatic, Normocephalic  NECK: Supple, No JVD, Normal thyroid  NERVOUS SYSTEM:  Alert & Oriented X3, Good concentration  CHEST/LUNG: Clear to percussion bilaterally; No rales, rhonchi, wheezing, or rubs  HEART: Regular rate and rhythm; No murmurs, rubs, or gallops  ABDOMEN: Soft, Nontender, Nondistended; Bowel sounds present  EXTREMITIES:  2+ Peripheral Pulses, No clubbing, cyanosis, or edema  SKIN: No rashes or lesions    INTERPRETATION OF TELEMETRY:    ECG:    I&O's Detail      LABS:                        12.8   12.70 )-----------( 228      ( 06 Mar 2024 16:06 )             38.9     03-06    141  |  104  |  26<H>  ----------------------------<  106<H>  4.2   |  22  |  0.8    Ca    9.8      06 Mar 2024 16:06    TPro  7.1  /  Alb  4.2  /  TBili  1.7<H>  /  DBili  x   /  AST  20  /  ALT  10  /  AlkPhos  96  03-06          Urinalysis Basic - ( 06 Mar 2024 16:06 )    Color: x / Appearance: x / SG: x / pH: x  Gluc: 106 mg/dL / Ketone: x  / Bili: x / Urobili: x   Blood: x / Protein: x / Nitrite: x   Leuk Esterase: x / RBC: x / WBC x   Sq Epi: x / Non Sq Epi: x / Bacteria: x      I&O's Summary      RADIOLOGY & ADDITIONAL STUDIES:

## 2024-03-07 ENCOUNTER — TRANSCRIPTION ENCOUNTER (OUTPATIENT)
Age: 84
End: 2024-03-07

## 2024-03-07 LAB
A1C WITH ESTIMATED AVERAGE GLUCOSE RESULT: 6.1 % — HIGH (ref 4–5.6)
ANION GAP SERPL CALC-SCNC: 10 MMOL/L — SIGNIFICANT CHANGE UP (ref 7–14)
APTT BLD: 33.4 SEC — SIGNIFICANT CHANGE UP (ref 27–39.2)
BASOPHILS # BLD AUTO: 0.06 K/UL — SIGNIFICANT CHANGE UP (ref 0–0.2)
BASOPHILS NFR BLD AUTO: 0.7 % — SIGNIFICANT CHANGE UP (ref 0–1)
BUN SERPL-MCNC: 29 MG/DL — HIGH (ref 10–20)
CALCIUM SERPL-MCNC: 9.5 MG/DL — SIGNIFICANT CHANGE UP (ref 8.4–10.5)
CHLORIDE SERPL-SCNC: 104 MMOL/L — SIGNIFICANT CHANGE UP (ref 98–110)
CO2 SERPL-SCNC: 24 MMOL/L — SIGNIFICANT CHANGE UP (ref 17–32)
CREAT SERPL-MCNC: 0.9 MG/DL — SIGNIFICANT CHANGE UP (ref 0.7–1.5)
EGFR: 64 ML/MIN/1.73M2 — SIGNIFICANT CHANGE UP
EOSINOPHIL # BLD AUTO: 0.28 K/UL — SIGNIFICANT CHANGE UP (ref 0–0.7)
EOSINOPHIL NFR BLD AUTO: 3.4 % — SIGNIFICANT CHANGE UP (ref 0–8)
ESTIMATED AVERAGE GLUCOSE: 128 MG/DL — HIGH (ref 68–114)
GLUCOSE BLDC GLUCOMTR-MCNC: 128 MG/DL — HIGH (ref 70–99)
GLUCOSE BLDC GLUCOMTR-MCNC: 152 MG/DL — HIGH (ref 70–99)
GLUCOSE BLDC GLUCOMTR-MCNC: 89 MG/DL — SIGNIFICANT CHANGE UP (ref 70–99)
GLUCOSE BLDC GLUCOMTR-MCNC: 97 MG/DL — SIGNIFICANT CHANGE UP (ref 70–99)
GLUCOSE SERPL-MCNC: 96 MG/DL — SIGNIFICANT CHANGE UP (ref 70–99)
HCT VFR BLD CALC: 35.4 % — LOW (ref 37–47)
HGB BLD-MCNC: 11.3 G/DL — LOW (ref 12–16)
IMM GRANULOCYTES NFR BLD AUTO: 0.4 % — HIGH (ref 0.1–0.3)
INR BLD: 1.05 RATIO — SIGNIFICANT CHANGE UP (ref 0.65–1.3)
LYMPHOCYTES # BLD AUTO: 1.72 K/UL — SIGNIFICANT CHANGE UP (ref 1.2–3.4)
LYMPHOCYTES # BLD AUTO: 20.6 % — SIGNIFICANT CHANGE UP (ref 20.5–51.1)
MAGNESIUM SERPL-MCNC: 2 MG/DL — SIGNIFICANT CHANGE UP (ref 1.8–2.4)
MCHC RBC-ENTMCNC: 26.8 PG — LOW (ref 27–31)
MCHC RBC-ENTMCNC: 31.9 G/DL — LOW (ref 32–37)
MCV RBC AUTO: 83.9 FL — SIGNIFICANT CHANGE UP (ref 81–99)
MONOCYTES # BLD AUTO: 0.83 K/UL — HIGH (ref 0.1–0.6)
MONOCYTES NFR BLD AUTO: 9.9 % — HIGH (ref 1.7–9.3)
NEUTROPHILS # BLD AUTO: 5.43 K/UL — SIGNIFICANT CHANGE UP (ref 1.4–6.5)
NEUTROPHILS NFR BLD AUTO: 65 % — SIGNIFICANT CHANGE UP (ref 42.2–75.2)
NRBC # BLD: 0 /100 WBCS — SIGNIFICANT CHANGE UP (ref 0–0)
PLATELET # BLD AUTO: 232 K/UL — SIGNIFICANT CHANGE UP (ref 130–400)
PMV BLD: 10.6 FL — HIGH (ref 7.4–10.4)
POTASSIUM SERPL-MCNC: 4.1 MMOL/L — SIGNIFICANT CHANGE UP (ref 3.5–5)
POTASSIUM SERPL-SCNC: 4.1 MMOL/L — SIGNIFICANT CHANGE UP (ref 3.5–5)
PROTHROM AB SERPL-ACNC: 12 SEC — SIGNIFICANT CHANGE UP (ref 9.95–12.87)
RBC # BLD: 4.22 M/UL — SIGNIFICANT CHANGE UP (ref 4.2–5.4)
RBC # FLD: 14.9 % — HIGH (ref 11.5–14.5)
SODIUM SERPL-SCNC: 138 MMOL/L — SIGNIFICANT CHANGE UP (ref 135–146)
T4 FREE+ TSH PNL SERPL: 2.39 UIU/ML — SIGNIFICANT CHANGE UP (ref 0.27–4.2)
WBC # BLD: 8.35 K/UL — SIGNIFICANT CHANGE UP (ref 4.8–10.8)
WBC # FLD AUTO: 8.35 K/UL — SIGNIFICANT CHANGE UP (ref 4.8–10.8)

## 2024-03-07 PROCEDURE — 99233 SBSQ HOSP IP/OBS HIGH 50: CPT

## 2024-03-07 PROCEDURE — 99223 1ST HOSP IP/OBS HIGH 75: CPT | Mod: 57

## 2024-03-07 PROCEDURE — 99497 ADVNCD CARE PLAN 30 MIN: CPT | Mod: 25

## 2024-03-07 RX ORDER — ATROPINE SULFATE 0.1 MG/ML
0.5 SYRINGE (ML) INJECTION ONCE
Refills: 0 | Status: DISCONTINUED | OUTPATIENT
Start: 2024-03-07 | End: 2024-03-09

## 2024-03-07 RX ADMIN — Medication 5 MILLIGRAM(S): at 17:16

## 2024-03-07 RX ADMIN — ATORVASTATIN CALCIUM 20 MILLIGRAM(S): 80 TABLET, FILM COATED ORAL at 22:45

## 2024-03-07 RX ADMIN — Medication 5 MILLIGRAM(S): at 05:27

## 2024-03-07 RX ADMIN — Medication 81 MILLIGRAM(S): at 11:12

## 2024-03-07 RX ADMIN — LOSARTAN POTASSIUM 50 MILLIGRAM(S): 100 TABLET, FILM COATED ORAL at 05:27

## 2024-03-07 NOTE — PATIENT PROFILE ADULT - FALL HARM RISK - RISK INTERVENTIONS
Assistance OOB with selected safe patient handling equipment/Assistance with ambulation/Communicate Fall Risk and Risk Factors to all staff, patient, and family/Discuss with provider need for PT consult/Monitor gait and stability/Reinforce activity limits and safety measures with patient and family/Visual Cue: Yellow wristband/Bed in lowest position, wheels locked, appropriate side rails in place/Call bell, personal items and telephone in reach/Instruct patient to call for assistance before getting out of bed or chair/Non-slip footwear when patient is out of bed/[place] to call system/Physically safe environment - no spills, clutter or unnecessary equipment/Purposeful Proactive Rounding/Room/bathroom lighting operational, light cord in reach

## 2024-03-07 NOTE — CONSULT NOTE ADULT - SUBJECTIVE AND OBJECTIVE BOX
Patient is a 83y old  Female who presents with a chief complaint of syncope (06 Mar 2024 23:32)        HPI:  Patient is a 83y Female with PMH of HTN, HLD, DM, s/p cholecystectomy who presents to the ED s/p syncopal episode. Patient reports she was walking in bedroom when she felt lightheaded and nauseous then syncopized. She fell hitting her head on a cardboard box. Patient's son heard the fall and found her on the floor, reported patient awoke almost immediately. Patient recalls the event and does not report any confusion post event. Denies any bowel/bladder incontinence, no tongue biting. Patient is being followed by cardiology as she had a syncopal event last month, MCOT was placed for 2 weeks showing a pause, follows with Dr. Lan and Dr. La, found to have symptomatic bradycardia and was scheduled for pacemaker next Tuesday. Currently patient is feeling well and denies any fever, chills, chest pain, SOB, visual changes, dizziness, headache, N/V/D, abdominal pain, or urinary sx.    In the ED, afebrile 97.8F, HR 61-78, /63, satting 99% on RA. Labs showed WBC 12.7, trop 25--> 25, CT head and neck no acute, EKG: Left ventricular hypertrophy with repolarization abnormality ( R in aVL ). Cardiology was consulted for syncope and pauses on  MCOT previously, recommending EP for PPM placement. Patient is [facility] to [facility] for management of syncope/pacemaker implantation.    (06 Mar 2024 23:32)      Electrophysiology:  83y Female with h/o HTN, HLD, DM2,, was seen by Dr Mcdonald last month for syncopal episode. 2 weeks MCOT was placed that revealed bradycardia and pause at night. No syncopal events during that period of time. Patient is scheduled for PPM next week for symptomatic get. Yesterday she was walking in bedroom when she felt lightheaded and nauseous then syncopized. She fell hitting her head on a cardboard box.. and presented to ED. Trauma workup was negative. On tele NSR 70 when awake 40 with occasional mid 30s when asleep      REVIEW OF SYSTEMS    [x ] A ten-point review of systems was otherwise negative except as noted.  [ ] Due to altered mental status/intubation, subjective information were not able to be obtained from the patient. History was obtained, to the extent possible, from review of the chart and collateral sources of information.      PAST MEDICAL & SURGICAL HISTORY:  Hypertension      Hyperlipidemia      Diabetes mellitus      History of cholecystectomy      S/P cataract surgery          Home Medications:  aspirin 81 mg oral delayed release tablet: 1 tab(s) orally once a day (21 Mar 2023 13:45)  atorvastatin 20 mg oral tablet: 1 tab(s) orally once a day (21 Mar 2023 13:45)  losartan-hydrochlorothiazide 50 mg-12.5 mg oral tablet: 1 tab(s) orally once a day (06 Mar 2024 23:28)  metFORMIN 500 mg oral tablet, extended release: 1 tab(s) orally once a day (21 Mar 2023 13:45)  oxybutynin 5 mg oral tablet: 1 tab(s) orally 2 times a day (21 Mar 2023 13:45)  pioglitazone 15 mg oral tablet: 1 tab(s) orally once a day (06 Mar 2024 23:30)      Allergies:  No Known Allergies      FAMILY HISTORY:  FH: breast cancer (Mother)    Family history of hypertrophic cardiomyopathy (Child)        SOCIAL HISTORY: shx    CIGARETTES:  ALCOHOL:  MARIJUANA:  ILLICIT DRUGS:        PREVIOUS DIAGNOSTIC TESTING:      ECHO  FINDINGS:  < from: TTE Echo Complete w/ Contrast w/ Doppler (23 @ 10:16) >  Summary:   1. Left ventricular ejection fraction, by visual estimation, is 60 to   65%.   2. Normal global left ventricular systolic function.   3. Spectral Doppler shows impaired relaxation pattern of left   ventricular myocardial filling (Grade I diastolic dysfunction).   4. Moderately enlarged left atrium.   5. Mild mitral regurgitation.    < end of copied text >    STRESS  FINDINGS:    CATHETERIZATION  FINDINGS:    ELECTROPHYSIOLOGY STUDY  FINDINGS:    CAROTID ULTRASOUND:  FINDINGS    VENOUS DUPLEX SCAN:  FINDINGS:  < from: VA Duplex Lower Ext Vein Scan, Bilat (23 @ 17:08) >  Normal compressibility of the LEFT common femoral, femoral and popliteal   veins.  Doppler examination shows normal spontaneous and phasic flow.  No LEFT calf vein thrombosis is detected.    < end of copied text >      CHEST CT PULMONARY ANGIO with IV Contrast:  FINDINGS:      MEDICATIONS  (STANDING):  aspirin enteric coated 81 milliGRAM(s) Oral daily  atorvastatin 20 milliGRAM(s) Oral at bedtime  dextrose 5%. 1000 milliLiter(s) (100 mL/Hr) IV Continuous <Continuous>  dextrose 5%. 1000 milliLiter(s) (50 mL/Hr) IV Continuous <Continuous>  dextrose 50% Injectable 25 Gram(s) IV Push once  dextrose 50% Injectable 12.5 Gram(s) IV Push once  dextrose 50% Injectable 25 Gram(s) IV Push once  enoxaparin Injectable 40 milliGRAM(s) SubCutaneous every 24 hours  glucagon  Injectable 1 milliGRAM(s) IntraMuscular once  hydrochlorothiazide 12.5 milliGRAM(s) Oral daily  insulin lispro (ADMELOG) corrective regimen sliding scale   SubCutaneous three times a day before meals  losartan 50 milliGRAM(s) Oral daily  oxybutynin 5 milliGRAM(s) Oral two times a day    MEDICATIONS  (PRN):  acetaminophen     Tablet .. 650 milliGRAM(s) Oral every 6 hours PRN Temp greater or equal to 38C (100.4F), Mild Pain (1 - 3)  atropine Injectable 0.5 milliGRAM(s) IntraMuscular once PRN HR < 40 and symptoms or SBP <90  dextrose Oral Gel 15 Gram(s) Oral once PRN Blood Glucose LESS THAN 70 milliGRAM(s)/deciliter  melatonin 3 milliGRAM(s) Oral at bedtime PRN Insomnia      Vital Signs Last 24 Hrs  T(C): 36.8 (07 Mar 2024 07:30), Max: 36.8 (07 Mar 2024 07:30)  T(F): 98.2 (07 Mar 2024 07:30), Max: 98.2 (07 Mar 2024 07:30)  HR: 34 (07 Mar 2024 10:43) (34 - 78)  BP: 115/55 (07 Mar 2024 10:43) (105/56 - 136/77)  BP(mean): 78 (07 Mar 2024 10:43) (78 - 78)  RR: 18 (07 Mar 2024 10:43) (18 - 18)  SpO2: 99% (07 Mar 2024 10:43) (95% - 99%)    Parameters below as of 07 Mar 2024 10:43  Patient On (Oxygen Delivery Method): room air        PHYSICAL EXAM:    GENERAL: In no apparent distress, well nourished, and hydrated.  HEAD:  Atraumatic, Normocephalic  EYES: EOMI, PERRLA, conjunctiva and sclera clear  NECK: Supple and normal thyroid.  No JVD or carotid bruit.  Carotid pulse is 2+ bilaterally.  HEART: Regular rate and rhythm; No murmurs, rubs, or gallops.  PULMONARY: Clear to auscultation and perfusion.  No rales, wheezing, or rhonchi bilaterally.  ABDOMEN: Soft, Nontender, Nondistended; Bowel sounds present  EXTREMITIES:  2+ Peripheral Pulses, No clubbing, cyanosis, or edema  NEUROLOGICAL: Grossly nonfocal    I&O's Detail    Daily     Daily     INTERPRETATION OF TELEMETRY:    EC Lead ECG:   Ventricular Rate 80 BPM    Atrial Rate 80 BPM    P-R Interval 208 ms    QRS Duration 76 ms    Q-T Interval 370 ms    QTC Calculation(Bazett) 426 ms    P Axis 83 degrees    R Axis -4 degrees    T Axis 127 degrees    Diagnosis Line Normal sinus rhythm  Left ventricular hypertrophy with repolarization abnormality ( R in aVL )  Abnormal ECG    Confirmed by Bhargav Hooper ([id number]) on 3/6/2024 5:55:02 PM (24 @ 14:22)        LABS:                        11.3   8.35  )-----------( 232      ( 07 Mar 2024 07:04 )             35.4     03-07    138  |  104  |  29<H>  ----------------------------<  96  4.1   |  24  |  0.9    Ca    9.5      07 Mar 2024 07:04  Mg     2.0     03-07    TPro  7.1  /  Alb  4.2  /  TBili  1.7<H>  /  DBili  x   /  AST  20  /  ALT  10  /  AlkPhos  96  03-06        PT/INR - ( 07 Mar 2024 11:27 )   PT: 12.00 sec;   INR: 1.05 ratio         PTT - ( 07 Mar 2024 11:27 )  PTT:33.4 sec    BNP            RADIOLOGY & ADDITIONAL STUDIES:

## 2024-03-07 NOTE — CONSULT NOTE ADULT - NS ATTEND AMEND GEN_ALL_CORE FT
Covering Dr. La  Complex patient  Sick sinus syndrome, pauses with symptoms - no reversible causes  recommend PPM  We discussed the risks/benefits/alternatives, nature of procedure, and follow up care after device is implanted. We also discussed remote monitoring in details. Patient is in agreement with proceeding with the device implant. We discussed the risks of bleeding, hematoma, injury to vessels and heart, perforation, tamponade, pneumothorax, infection, lead dislodgment, device malfunction, and rare risks of stroke/heart attack/death. Patient expressed understanding of the discussion. I answered all the questions in details.

## 2024-03-07 NOTE — CONSULT NOTE ADULT - ASSESSMENT
Syd La    83y Female with h/o HTN, HLD, DM2,,with symptomatic bradycardia and pauses and 2 syncopal episodes    symptomatic bradycardia  syncope  HTN, HLD, DM        con't tele  Check TSH / Free T4  Echocardiogram  Maintain electrolytes K>4.0 Mg >2.0  will plan PPM placement this admission, date TBD,    Syd La    83y Female with h/o HTN, HLD, DM2,,with symptomatic bradycardia and pauses and 2 syncopal episodes    symptomatic bradycardia due to sick sinus syndrome  syncope  HTN, HLD, DM        con't tele  Check TSH / Free T4  Echocardiogram  Maintain electrolytes K>4.0 Mg >2.0  will plan PPM placement this admission, date anticipated on Friday

## 2024-03-07 NOTE — DISCHARGE NOTE NURSING/CASE MANAGEMENT/SOCIAL WORK - PATIENT PORTAL LINK FT
You can access the FollowMyHealth Patient Portal offered by [facility] by registering at the following website: [email or web address]. By joining [employer]’s FollowMyHealth portal, you will also be able to view your health information using other applications (apps) compatible with our system.

## 2024-03-08 PROBLEM — E11.9 TYPE 2 DIABETES MELLITUS WITHOUT COMPLICATIONS: Chronic | Status: ACTIVE | Noted: 2024-03-06

## 2024-03-08 PROBLEM — E78.5 HYPERLIPIDEMIA, UNSPECIFIED: Chronic | Status: ACTIVE | Noted: 2024-03-06

## 2024-03-08 PROBLEM — I10 ESSENTIAL (PRIMARY) HYPERTENSION: Chronic | Status: ACTIVE | Noted: 2024-03-06

## 2024-03-08 LAB
ALBUMIN SERPL ELPH-MCNC: 3.8 G/DL — SIGNIFICANT CHANGE UP (ref 3.5–5.2)
ALP SERPL-CCNC: 96 U/L — SIGNIFICANT CHANGE UP (ref 30–115)
ALT FLD-CCNC: 9 U/L — SIGNIFICANT CHANGE UP (ref 0–41)
ANION GAP SERPL CALC-SCNC: 9 MMOL/L — SIGNIFICANT CHANGE UP (ref 7–14)
APPEARANCE UR: CLEAR — SIGNIFICANT CHANGE UP
AST SERPL-CCNC: 15 U/L — SIGNIFICANT CHANGE UP (ref 0–41)
BASOPHILS # BLD AUTO: 0.05 K/UL — SIGNIFICANT CHANGE UP (ref 0–0.2)
BASOPHILS NFR BLD AUTO: 0.5 % — SIGNIFICANT CHANGE UP (ref 0–1)
BILIRUB SERPL-MCNC: 1.3 MG/DL — HIGH (ref 0.2–1.2)
BILIRUB UR-MCNC: NEGATIVE — SIGNIFICANT CHANGE UP
BUN SERPL-MCNC: 32 MG/DL — HIGH (ref 10–20)
CALCIUM SERPL-MCNC: 9.5 MG/DL — SIGNIFICANT CHANGE UP (ref 8.4–10.5)
CHLORIDE SERPL-SCNC: 105 MMOL/L — SIGNIFICANT CHANGE UP (ref 98–110)
CO2 SERPL-SCNC: 24 MMOL/L — SIGNIFICANT CHANGE UP (ref 17–32)
COLOR SPEC: YELLOW — SIGNIFICANT CHANGE UP
CREAT SERPL-MCNC: 1.1 MG/DL — SIGNIFICANT CHANGE UP (ref 0.7–1.5)
DIFF PNL FLD: NEGATIVE — SIGNIFICANT CHANGE UP
EGFR: 50 ML/MIN/1.73M2 — LOW
EOSINOPHIL # BLD AUTO: 0.35 K/UL — SIGNIFICANT CHANGE UP (ref 0–0.7)
EOSINOPHIL NFR BLD AUTO: 3.8 % — SIGNIFICANT CHANGE UP (ref 0–8)
GLUCOSE BLDC GLUCOMTR-MCNC: 100 MG/DL — HIGH (ref 70–99)
GLUCOSE BLDC GLUCOMTR-MCNC: 103 MG/DL — HIGH (ref 70–99)
GLUCOSE BLDC GLUCOMTR-MCNC: 112 MG/DL — HIGH (ref 70–99)
GLUCOSE BLDC GLUCOMTR-MCNC: 171 MG/DL — HIGH (ref 70–99)
GLUCOSE SERPL-MCNC: 101 MG/DL — HIGH (ref 70–99)
GLUCOSE UR QL: 500 MG/DL
HCT VFR BLD CALC: 37.1 % — SIGNIFICANT CHANGE UP (ref 37–47)
HGB BLD-MCNC: 12.1 G/DL — SIGNIFICANT CHANGE UP (ref 12–16)
IMM GRANULOCYTES NFR BLD AUTO: 0.3 % — SIGNIFICANT CHANGE UP (ref 0.1–0.3)
KETONES UR-MCNC: ABNORMAL MG/DL
LEUKOCYTE ESTERASE UR-ACNC: NEGATIVE — SIGNIFICANT CHANGE UP
LYMPHOCYTES # BLD AUTO: 1.8 K/UL — SIGNIFICANT CHANGE UP (ref 1.2–3.4)
LYMPHOCYTES # BLD AUTO: 19.7 % — LOW (ref 20.5–51.1)
MAGNESIUM SERPL-MCNC: 2 MG/DL — SIGNIFICANT CHANGE UP (ref 1.8–2.4)
MCHC RBC-ENTMCNC: 27.5 PG — SIGNIFICANT CHANGE UP (ref 27–31)
MCHC RBC-ENTMCNC: 32.6 G/DL — SIGNIFICANT CHANGE UP (ref 32–37)
MCV RBC AUTO: 84.3 FL — SIGNIFICANT CHANGE UP (ref 81–99)
MONOCYTES # BLD AUTO: 0.86 K/UL — HIGH (ref 0.1–0.6)
MONOCYTES NFR BLD AUTO: 9.4 % — HIGH (ref 1.7–9.3)
NEUTROPHILS # BLD AUTO: 6.05 K/UL — SIGNIFICANT CHANGE UP (ref 1.4–6.5)
NEUTROPHILS NFR BLD AUTO: 66.3 % — SIGNIFICANT CHANGE UP (ref 42.2–75.2)
NITRITE UR-MCNC: NEGATIVE — SIGNIFICANT CHANGE UP
NRBC # BLD: 0 /100 WBCS — SIGNIFICANT CHANGE UP (ref 0–0)
PH UR: 5.5 — SIGNIFICANT CHANGE UP (ref 5–8)
PLATELET # BLD AUTO: 222 K/UL — SIGNIFICANT CHANGE UP (ref 130–400)
PMV BLD: 10.7 FL — HIGH (ref 7.4–10.4)
POTASSIUM SERPL-MCNC: 4.2 MMOL/L — SIGNIFICANT CHANGE UP (ref 3.5–5)
POTASSIUM SERPL-SCNC: 4.2 MMOL/L — SIGNIFICANT CHANGE UP (ref 3.5–5)
PROT SERPL-MCNC: 6.3 G/DL — SIGNIFICANT CHANGE UP (ref 6–8)
PROT UR-MCNC: NEGATIVE MG/DL — SIGNIFICANT CHANGE UP
RBC # BLD: 4.4 M/UL — SIGNIFICANT CHANGE UP (ref 4.2–5.4)
RBC # FLD: 14.9 % — HIGH (ref 11.5–14.5)
SODIUM SERPL-SCNC: 138 MMOL/L — SIGNIFICANT CHANGE UP (ref 135–146)
SP GR SPEC: 1.02 — SIGNIFICANT CHANGE UP (ref 1–1.03)
UROBILINOGEN FLD QL: 1 MG/DL — SIGNIFICANT CHANGE UP (ref 0.2–1)
WBC # BLD: 9.14 K/UL — SIGNIFICANT CHANGE UP (ref 4.8–10.8)
WBC # FLD AUTO: 9.14 K/UL — SIGNIFICANT CHANGE UP (ref 4.8–10.8)

## 2024-03-08 PROCEDURE — 99233 SBSQ HOSP IP/OBS HIGH 50: CPT | Mod: 57

## 2024-03-08 PROCEDURE — 71045 X-RAY EXAM CHEST 1 VIEW: CPT | Mod: 26,77

## 2024-03-08 PROCEDURE — 33208 INSRT HEART PM ATRIAL & VENT: CPT | Mod: KX

## 2024-03-08 PROCEDURE — 99232 SBSQ HOSP IP/OBS MODERATE 35: CPT | Mod: 57

## 2024-03-08 PROCEDURE — 71045 X-RAY EXAM CHEST 1 VIEW: CPT | Mod: 26

## 2024-03-08 PROCEDURE — 99232 SBSQ HOSP IP/OBS MODERATE 35: CPT

## 2024-03-08 RX ORDER — CEFAZOLIN SODIUM 1 G
1000 VIAL (EA) INJECTION EVERY 8 HOURS
Refills: 0 | Status: COMPLETED | OUTPATIENT
Start: 2024-03-08 | End: 2024-03-09

## 2024-03-08 RX ORDER — KETOROLAC TROMETHAMINE 30 MG/ML
10 SYRINGE (ML) INJECTION ONCE
Refills: 0 | Status: DISCONTINUED | OUTPATIENT
Start: 2024-03-08 | End: 2024-03-08

## 2024-03-08 RX ORDER — HYDROMORPHONE HYDROCHLORIDE 2 MG/ML
0.5 INJECTION INTRAMUSCULAR; INTRAVENOUS; SUBCUTANEOUS
Refills: 0 | Status: DISCONTINUED | OUTPATIENT
Start: 2024-03-08 | End: 2024-03-08

## 2024-03-08 RX ORDER — ONDANSETRON 8 MG/1
4 TABLET, FILM COATED ORAL EVERY 4 HOURS
Refills: 0 | Status: DISCONTINUED | OUTPATIENT
Start: 2024-03-08 | End: 2024-03-08

## 2024-03-08 RX ADMIN — Medication 100 MILLIGRAM(S): at 23:48

## 2024-03-08 RX ADMIN — Medication 5 MILLIGRAM(S): at 18:42

## 2024-03-08 RX ADMIN — LOSARTAN POTASSIUM 50 MILLIGRAM(S): 100 TABLET, FILM COATED ORAL at 05:20

## 2024-03-08 RX ADMIN — Medication 650 MILLIGRAM(S): at 18:47

## 2024-03-08 RX ADMIN — Medication 81 MILLIGRAM(S): at 11:45

## 2024-03-08 RX ADMIN — Medication 10 MILLIGRAM(S): at 23:48

## 2024-03-08 RX ADMIN — ATORVASTATIN CALCIUM 20 MILLIGRAM(S): 80 TABLET, FILM COATED ORAL at 22:58

## 2024-03-08 RX ADMIN — Medication 5 MILLIGRAM(S): at 05:19

## 2024-03-08 NOTE — CHART NOTE - NSCHARTNOTEFT_GEN_A_CORE
PACU ANESTHESIA ADMISSION NOTE      Procedure: Insertion of DDD cardiac pacemaker      Post op diagnosis:  Syncope    SSS (sick sinus syndrome)        ____  Intubated  TV:______       Rate: ______      FiO2: ______    __x__  Patent Airway    __x__  Full return of protective reflexes    __x__  Full recovery from anesthesia / back to baseline     Vitals:   T:   97        R:    20              BP:  115/61                Sat:     96              P: 89      Mental Status:  __x__ Awake   __x__ Alert   _____ Drowsy   _____ Sedated    Nausea/Vomiting:  __x__ NO  ______Yes,   See Post - Op Orders          Pain Scale (0-10):  _0____    Treatment: ____ None    ___x_ See Post - Op/PCA Orders    Post - Operative Fluids:   ____ Oral   __x__ See Post - Op Orders    Plan: Discharge:   ____Home       _x____Floor     _____Critical Care    _____  Other:_________________    Comments:

## 2024-03-08 NOTE — CONSULT NOTE ADULT - SUBJECTIVE AND OBJECTIVE BOX
Surgeon: Dr. Hall/John/Dave    Consult requesting by: MD Hendrix    HISTORY OF PRESENT ILLNESS:  Patient is a 83 year-old female with a past medical history HTN, HLD, DM, s/p cholecystectomy who presents to the ED s/p syncopal episode. Patient reports she was walking in bedroom when she felt lightheaded and nauseous then synopsized. She fell hitting her head on a cardboard box. Patient's son heard the fall and found her on the floor, reported patient awoke almost immediately. Patient recalls the event and does not report any confusion post event. Denies any bowel/bladder incontinence, no tongue biting. Patient is being followed by cardiology as she had a syncopal event last month, MCOT was placed for 2 weeks showing a pause, follows with Dr. Lan and Dr. La, found to have symptomatic bradycardia and was scheduled for pacemaker next Tuesday. Currently patient is feeling well and denies any fever, chills, chest pain, SOB, visual changes, dizziness, headache, N/V/D, abdominal pain, or urinary sx.    In the ED, afebrile 97.8F, HR 61-78, /63, satting 99% on RA. Labs showed WBC 12.7, trop 25--> 25, CT head and neck no acute, EKG: Left ventricular hypertrophy with repolarization abnormality ( R in aVL ). Cardiology was consulted for syncope and pauses on  MCOT previously, recommending EP for PPM placement. Patient is admitted to [facility] for management of syncope/pacemaker implantation. CT Surgery was consulted for an evaluation and PPM placement.       PAST MEDICAL & SURGICAL HISTORY:  Hypertension      Hyperlipidemia      Diabetes mellitus      History of cholecystectomy      S/P cataract surgery          MEDICATIONS  (STANDING):  aspirin enteric coated 81 milliGRAM(s) Oral daily  atorvastatin 20 milliGRAM(s) Oral at bedtime  dextrose 5%. 1000 milliLiter(s) (100 mL/Hr) IV Continuous <Continuous>  dextrose 5%. 1000 milliLiter(s) (50 mL/Hr) IV Continuous <Continuous>  dextrose 50% Injectable 25 Gram(s) IV Push once  dextrose 50% Injectable 12.5 Gram(s) IV Push once  dextrose 50% Injectable 25 Gram(s) IV Push once  glucagon  Injectable 1 milliGRAM(s) IntraMuscular once  insulin lispro (ADMELOG) corrective regimen sliding scale   SubCutaneous three times a day before meals  losartan 50 milliGRAM(s) Oral daily  oxybutynin 5 milliGRAM(s) Oral two times a day    MEDICATIONS  (PRN):  acetaminophen     Tablet .. 650 milliGRAM(s) Oral every 6 hours PRN Temp greater or equal to 38C (100.4F), Mild Pain (1 - 3)  atropine Injectable 0.5 milliGRAM(s) IntraMuscular once PRN HR < 40 and symptoms or SBP <90  dextrose Oral Gel 15 Gram(s) Oral once PRN Blood Glucose LESS THAN 70 milliGRAM(s)/deciliter  melatonin 3 milliGRAM(s) Oral at bedtime PRN Insomnia      Home Medications:  aspirin 81 mg oral delayed release tablet: 1 tab(s) orally once a day (21 Mar 2023 13:45)  atorvastatin 20 mg oral tablet: 1 tab(s) orally once a day (21 Mar 2023 13:45)  losartan-hydrochlorothiazide 50 mg-12.5 mg oral tablet: 1 tab(s) orally once a day (06 Mar 2024 23:28)  metFORMIN 500 mg oral tablet, extended release: 1 tab(s) orally once a day (21 Mar 2023 13:45)  oxybutynin 5 mg oral tablet: 1 tab(s) orally 2 times a day (21 Mar 2023 13:45)  pioglitazone 15 mg oral tablet: 1 tab(s) orally once a day (06 Mar 2024 23:30)      Allergies    No Known Allergies    Intolerances      SOCIAL HISTORY:  Smoker: [ ] Yes  [X] No        PACK YEARS:                         WHEN QUIT?  ETOH use: [ ] Yes  [X] No              FREQUENCY / QUANTITY:  Illicit Drug use:  [ ] Yes  [X] No  Occupation: retired  Lives with:  & son, house  Assisted device use: independent    FAMILY HISTORY:  FH: breast cancer (Mother)    Family history of hypertrophic cardiomyopathy (Child)        Review of Systems  CONSTITUTIONAL:  Fevers[ ] chills[ ] sweats[ ] fatigue[ ] weight loss[ ] weight gain [ ]                                     NEGATIVE [X ]   NEURO:  paresthesias[ ] seizures [ ]  syncope [ ]  confusion [ ]                                                                                NEGATIVE[ X]   EYES: glasses[ ]  blurry vision[ ]  discharge[ ] pain[ ] glaucoma [ ]                                                                          NEGATIVE[X ]   ENMT:  difficulty hearing [ ]  vertigo[ ]  dysphagia[ ] epistaxis[ ] recent dental work [ ]                                    NEGATIVE[ X]   CV:  chest pain[ ] palpitations[ ] MAIER [ ] diaphoresis [ ]                                                                                           NEGATIVE[ X]   RESPIRATORY:  wheezing[ ] SOB[ ] cough [ ] sputum[ ] hemoptysis[ ]                                                                  NEGATIVE[ ]   GI:  nausea[ ]  vomiting [ ]  diarrhea[ ] constipation [ ] melena [ ]                                                                         NEGATIVE[ X]   : hematuria[ ]  dysuria[ ] urgency[ ] incontinence[ ]                                                                                            NEGATIVE[ X]   MUSKULOSKELETAL:  arthritis[ ]  joint swelling [ ] muscle weakness [ ] Hx vein stripping [ ]                             NEGATIVE[X ]   SKIN/BREAST:  rash[ ] itching [ ]  hair loss[ ] masses[ ]                                                                                              NEGATIVE[ X]   PSYCH:  dementia [ ] depression [ ] anxiety[ ]                                                                                                               NEGATIVE[X ]   HEME/LYMPH:  bruises easily[ ] enlarged lymph nodes[ ] tender lymph nodes[ ]                                               NEGATIVE[ X]   ENDOCRINE:  cold intolerance[ ] heat intolerance[ ] polydipsia[ ]                                                                          NEGATIVE[ X]     PHYSICAL EXAM  Vital Signs Last 24 Hrs  T(C): 36.1 (08 Mar 2024 12:30), Max: 36.4 (07 Mar 2024 20:10)  T(F): 96.9 (08 Mar 2024 12:30), Max: 97.6 (07 Mar 2024 20:10)  HR: 92 (08 Mar 2024 12:30) (62 - 92)  BP: 141/67 (08 Mar 2024 12:30) (104/58 - 141/67)  RR: 18 (08 Mar 2024 12:30) (18 - 18)        CONSTITUTIONAL:  WNL[X]   Neuro: WNL [X] Normal exam oriented to person/place & time with no focal motor or sensory  deficits. Other                     Eyes:    WNL [X] Normal exam of conjunctiva & lids, pupils equally reactive. Other     ENT:     WNL [X] Normal exam of nasal/oral mucosa with absence of cyanosis. Other  Neck:   WNL [X] Normal exam of jugular veins, trachea & thyroid. Other  Chest:  WNL [X] Normal lung exam with good air movement absence of wheezes, rales, or rhonchi: Other                                                                                CV:  Auscultation: normal [X] S3[ ] S4[ ] Irregular [ ] Rub[ ] Clicks[ ]    Murmurs none:[X]systolic [ ]  diastolic [ ] holosystolic [ ]  Carotids: No Bruits [X] Other                  Abdominal Aorta: normal [X] nonpalpable[ ]Other                                                                                      GI: WNL[X] Normal exam of abdomen, liver & spleen with no noted masses or tenderness. Other                                                                                                        Extremities: WNL[X] Normal no evidence of cyanosis or deformity Edema: none[ ]trace[ ]1+[ ]2+[ ]3+[ ]4+[ ]  Lower Extremity Pulses: Right[X] Left[X]Varicosities[ ]  SKIN :WNL[X] Normal exam to inspection & palpation. Other:                                                          LABS:                        12.1   9.14  )-----------( 222      ( 08 Mar 2024 07:51 )             37.1     03-08    138  |  105  |  32<H>  ----------------------------<  101<H>  4.2   |  24  |  1.1    Ca    9.5      08 Mar 2024 07:51  Mg     2.0     03-08    TPro  6.3  /  Alb  3.8  /  TBili  1.3<H>  /  DBili  x   /  AST  15  /  ALT  9   /  AlkPhos  96  03-08    PT/INR - ( 07 Mar 2024 11:27 )   PT: 12.00 sec;   INR: 1.05 ratio         PTT - ( 07 Mar 2024 11:27 )  PTT:33.4 sec  Urinalysis Basic - ( 08 Mar 2024 07:51 )    Color: x / Appearance: x / SG: x / pH: x  Gluc: 101 mg/dL / Ketone: x  / Bili: x / Urobili: x   Blood: x / Protein: x / Nitrite: x   Leuk Esterase: x / RBC: x / WBC x   Sq Epi: x / Non Sq Epi: x / Bacteria: x            Assessment/ Plan:     Patient is a 83 year-old female with a past medical history HTN, HLD, DM, s/p cholecystectomy who presents to the ED s/p syncopal episode. Patient reports she was walking in bedroom when she felt lightheaded and nauseous then synopsized. She fell hitting her head on a cardboard box. Patient's son heard the fall and found her on the floor, reported patient awoke almost immediately. Patient recalls the event and does not report any confusion post event. Denies any bowel/bladder incontinence, no tongue biting. Patient is being followed by cardiology as she had a syncopal event last month, MCOT was placed for 2 weeks showing a pause, follows with Dr. Lan and Dr. La, found to have symptomatic bradycardia and was scheduled for pacemaker next Tuesday. Currently patient is feeling well and denies any fever, chills, chest pain, SOB, visual changes, dizziness, headache, N/V/D, abdominal pain, or urinary sx.    In the ED, afebrile 97.8F, HR 61-78, /63, satting 99% on RA. Labs showed WBC 12.7, trop 25--> 25, CT head and neck no acute, EKG: Left ventricular hypertrophy with repolarization abnormality ( R in aVL ). Cardiology was consulted for syncope and pauses on  MCOT previously, recommending EP for PPM placement. Patient is admitted to [facility] for management of syncope/pacemaker implantation. CT Surgery was consulted for an evaluation and PPM placement.     -Case and plan discussed with CT surgeon Dr. Lopez and EP MD Leija, patient NPO, pre-op for PPM placement today.                         Surgeon: Dr. Hall/John/Dave    Consult requesting by: MD Hendrix    HISTORY OF PRESENT ILLNESS:  Patient is a 83 year-old female with a past medical history HTN, HLD, DM, s/p cholecystectomy who presents to the ED s/p syncopal episode. Patient reports she was walking in bedroom when she felt lightheaded and nauseous then synopsized. She fell hitting her head on a cardboard box. Patient's son heard the fall and found her on the floor, reported patient awoke almost immediately. Patient recalls the event and does not report any confusion post event. Denies any bowel/bladder incontinence, no tongue biting. Patient is being followed by cardiology as she had a syncopal event last month, MCOT was placed for 2 weeks showing a pause, follows with Dr. Lan and Dr. La, found to have symptomatic bradycardia and was scheduled for pacemaker next Tuesday. Currently patient is feeling well and denies any fever, chills, chest pain, SOB, visual changes, dizziness, headache, N/V/D, abdominal pain, or urinary sx.    In the ED, afebrile 97.8F, HR 61-78, /63, satting 99% on RA. Labs showed WBC 12.7, trop 25--> 25, CT head and neck no acute, EKG: Left ventricular hypertrophy with repolarization abnormality ( R in aVL ). Cardiology was consulted for syncope and pauses on  MCOT previously, recommending EP for PPM placement. Patient is admitted to [facility] for management of syncope/pacemaker implantation. CT Surgery was consulted for an evaluation and PPM placement.       PAST MEDICAL & SURGICAL HISTORY:  Hypertension      Hyperlipidemia      Diabetes mellitus      History of cholecystectomy      S/P cataract surgery          MEDICATIONS  (STANDING):  aspirin enteric coated 81 milliGRAM(s) Oral daily  atorvastatin 20 milliGRAM(s) Oral at bedtime  dextrose 5%. 1000 milliLiter(s) (100 mL/Hr) IV Continuous <Continuous>  dextrose 5%. 1000 milliLiter(s) (50 mL/Hr) IV Continuous <Continuous>  dextrose 50% Injectable 25 Gram(s) IV Push once  dextrose 50% Injectable 12.5 Gram(s) IV Push once  dextrose 50% Injectable 25 Gram(s) IV Push once  glucagon  Injectable 1 milliGRAM(s) IntraMuscular once  insulin lispro (ADMELOG) corrective regimen sliding scale   SubCutaneous three times a day before meals  losartan 50 milliGRAM(s) Oral daily  oxybutynin 5 milliGRAM(s) Oral two times a day    MEDICATIONS  (PRN):  acetaminophen     Tablet .. 650 milliGRAM(s) Oral every 6 hours PRN Temp greater or equal to 38C (100.4F), Mild Pain (1 - 3)  atropine Injectable 0.5 milliGRAM(s) IntraMuscular once PRN HR < 40 and symptoms or SBP <90  dextrose Oral Gel 15 Gram(s) Oral once PRN Blood Glucose LESS THAN 70 milliGRAM(s)/deciliter  melatonin 3 milliGRAM(s) Oral at bedtime PRN Insomnia      Home Medications:  aspirin 81 mg oral delayed release tablet: 1 tab(s) orally once a day (21 Mar 2023 13:45)  atorvastatin 20 mg oral tablet: 1 tab(s) orally once a day (21 Mar 2023 13:45)  losartan-hydrochlorothiazide 50 mg-12.5 mg oral tablet: 1 tab(s) orally once a day (06 Mar 2024 23:28)  metFORMIN 500 mg oral tablet, extended release: 1 tab(s) orally once a day (21 Mar 2023 13:45)  oxybutynin 5 mg oral tablet: 1 tab(s) orally 2 times a day (21 Mar 2023 13:45)  pioglitazone 15 mg oral tablet: 1 tab(s) orally once a day (06 Mar 2024 23:30)      Allergies    No Known Allergies    Intolerances      SOCIAL HISTORY:  Smoker: [ ] Yes  [X] No        PACK YEARS:                         WHEN QUIT?  ETOH use: [ ] Yes  [X] No              FREQUENCY / QUANTITY:  Illicit Drug use:  [ ] Yes  [X] No  Occupation: retired  Lives with:  & son, house  Assisted device use: independent    FAMILY HISTORY:  FH: breast cancer (Mother)    Family history of hypertrophic cardiomyopathy (Child)        Review of Systems  CONSTITUTIONAL:  Fevers[ ] chills[ ] sweats[ ] fatigue[ ] weight loss[ ] weight gain [ ]                                     NEGATIVE [X ]   NEURO:  paresthesias[ ] seizures [ ]  syncope [ ]  confusion [ ]                                                                                NEGATIVE[ X]   EYES: glasses[ ]  blurry vision[ ]  discharge[ ] pain[ ] glaucoma [ ]                                                                          NEGATIVE[X ]   ENMT:  difficulty hearing [ ]  vertigo[ ]  dysphagia[ ] epistaxis[ ] recent dental work [ ]                                    NEGATIVE[ X]   CV:  chest pain[ ] palpitations[ ] MAIER [ ] diaphoresis [ ]                                                                                           NEGATIVE[ X]   RESPIRATORY:  wheezing[ ] SOB[ ] cough [ ] sputum[ ] hemoptysis[ ]                                                                  NEGATIVE[ ]   GI:  nausea[ ]  vomiting [ ]  diarrhea[ ] constipation [ ] melena [ ]                                                                         NEGATIVE[ X]   : hematuria[ ]  dysuria[ ] urgency[ ] incontinence[ ]                                                                                            NEGATIVE[ X]   MUSKULOSKELETAL:  arthritis[ ]  joint swelling [ ] muscle weakness [ ] Hx vein stripping [ ]                             NEGATIVE[X ]   SKIN/BREAST:  rash[ ] itching [ ]  hair loss[ ] masses[ ]                                                                                              NEGATIVE[ X]   PSYCH:  dementia [ ] depression [ ] anxiety[ ]                                                                                                               NEGATIVE[X ]   HEME/LYMPH:  bruises easily[ ] enlarged lymph nodes[ ] tender lymph nodes[ ]                                               NEGATIVE[ X]   ENDOCRINE:  cold intolerance[ ] heat intolerance[ ] polydipsia[ ]                                                                          NEGATIVE[ X]     PHYSICAL EXAM  Vital Signs Last 24 Hrs  T(C): 36.1 (08 Mar 2024 12:30), Max: 36.4 (07 Mar 2024 20:10)  T(F): 96.9 (08 Mar 2024 12:30), Max: 97.6 (07 Mar 2024 20:10)  HR: 92 (08 Mar 2024 12:30) (62 - 92)  BP: 141/67 (08 Mar 2024 12:30) (104/58 - 141/67)  RR: 18 (08 Mar 2024 12:30) (18 - 18)        CONSTITUTIONAL:  WNL[X]   Neuro: WNL [X] Normal exam oriented to person/place & time with no focal motor or sensory  deficits. Other                     Eyes:    WNL [X] Normal exam of conjunctiva & lids, pupils equally reactive. Other     ENT:     WNL [X] Normal exam of nasal/oral mucosa with absence of cyanosis. Other  Neck:   WNL [X] Normal exam of jugular veins, trachea & thyroid. Other  Chest:  WNL [X] Normal lung exam with good air movement absence of wheezes, rales, or rhonchi: Other                                                                                CV:  Auscultation: normal [X] S3[ ] S4[ ] Irregular [ ] Rub[ ] Clicks[ ]    Murmurs none:[X]systolic [ ]  diastolic [ ] holosystolic [ ]  Carotids: No Bruits [X] Other                  Abdominal Aorta: normal [X] nonpalpable[ ]Other                                                                                      GI: WNL[X] Normal exam of abdomen, liver & spleen with no noted masses or tenderness. Other                                                                                                        Extremities: WNL[X] Normal no evidence of cyanosis or deformity Edema: none[ ]trace[ ]1+[ ]2+[ ]3+[ ]4+[ ]  Lower Extremity Pulses: Right[X] Left[X]Varicosities[ ]  SKIN :WNL[X] Normal exam to inspection & palpation. Other:                                                          LABS:                        12.1   9.14  )-----------( 222      ( 08 Mar 2024 07:51 )             37.1     03-08    138  |  105  |  32<H>  ----------------------------<  101<H>  4.2   |  24  |  1.1    Ca    9.5      08 Mar 2024 07:51  Mg     2.0     03-08    TPro  6.3  /  Alb  3.8  /  TBili  1.3<H>  /  DBili  x   /  AST  15  /  ALT  9   /  AlkPhos  96  03-08    PT/INR - ( 07 Mar 2024 11:27 )   PT: 12.00 sec;   INR: 1.05 ratio         PTT - ( 07 Mar 2024 11:27 )  PTT:33.4 sec  Urinalysis Basic - ( 08 Mar 2024 07:51 )    Color: x / Appearance: x / SG: x / pH: x  Gluc: 101 mg/dL / Ketone: x  / Bili: x / Urobili: x   Blood: x / Protein: x / Nitrite: x   Leuk Esterase: x / RBC: x / WBC x   Sq Epi: x / Non Sq Epi: x / Bacteria: x            Assessment/ Plan:     Patient is a 83 year-old female with a past medical history HTN, HLD, DM, s/p cholecystectomy who presents to the ED s/p syncopal episode. Patient reports she was walking in bedroom when she felt lightheaded and nauseous then synopsized. She fell hitting her head on a cardboard box. Patient's son heard the fall and found her on the floor, reported patient awoke almost immediately. Patient recalls the event and does not report any confusion post event. Denies any bowel/bladder incontinence, no tongue biting. Patient is being followed by cardiology as she had a syncopal event last month, MCOT was placed for 2 weeks showing a pause, follows with Dr. Lan and Dr. La, found to have symptomatic bradycardia and was scheduled for pacemaker next Tuesday. Currently patient is feeling well and denies any fever, chills, chest pain, SOB, visual changes, dizziness, headache, N/V/D, abdominal pain, or urinary sx.    In the ED, afebrile 97.8F, HR 61-78, /63, satting 99% on RA. Labs showed WBC 12.7, trop 25--> 25, CT head and neck no acute, EKG: Left ventricular hypertrophy with repolarization abnormality ( R in aVL ). Cardiology was consulted for syncope and pauses on  MCOT previously, recommending EP for PPM placement. Patient is admitted to [facility] for management of syncope/pacemaker implantation. CT Surgery was consulted for an evaluation and PPM placement.     -Case and plan discussed with CT surgeon Dr. Lopez and EP MD Leija, patient NPO, pre-op for PPM placement today.            CTS Attending  -------------------  pt referred for PPM implant  had syncope/bradycardia/pause  procedure explained including risks, benefits and alternatives and agreed to proceed   35-min consult-preop-decision for surgery  -FMR

## 2024-03-08 NOTE — BRIEF OPERATIVE NOTE - NSICDXBRIEFPOSTOP_GEN_ALL_CORE_FT
POST-OP DIAGNOSIS:  SSS (sick sinus syndrome) 08-Mar-2024 17:36:12  Eris Lopez  Syncope 08-Mar-2024 17:36:07  Eris Lopez

## 2024-03-08 NOTE — PROGRESS NOTE ADULT - ATTENDING COMMENTS
I saw and examined the patient at bedside.   She feels ok  Telemetry showed episode of sinus pause    A/P:   Syncope and fall  Sick sinus syndrome:   She is going for pacemaker today.

## 2024-03-08 NOTE — CONSULT NOTE ADULT - TIME BILLING
CTS Attending  -------------------  pt referred for PPM implant  had syncope/bradycardia/pause  procedure explained including risks, benefits and alternatives and agreed to proceed   35-min consult-preop-decision for surgery  -FMR
SSS, pause

## 2024-03-08 NOTE — BRIEF OPERATIVE NOTE - NSICDXBRIEFPREOP_GEN_ALL_CORE_FT
PRE-OP DIAGNOSIS:  Syncope 08-Mar-2024 17:36:00  Eris Lopez  SSS (sick sinus syndrome) 08-Mar-2024 17:35:50  Eris Lopez

## 2024-03-09 ENCOUNTER — TRANSCRIPTION ENCOUNTER (OUTPATIENT)
Age: 84
End: 2024-03-09

## 2024-03-09 LAB
ALBUMIN SERPL ELPH-MCNC: 3.7 G/DL — SIGNIFICANT CHANGE UP (ref 3.5–5.2)
ALP SERPL-CCNC: 87 U/L — SIGNIFICANT CHANGE UP (ref 30–115)
ALT FLD-CCNC: 7 U/L — SIGNIFICANT CHANGE UP (ref 0–41)
ANION GAP SERPL CALC-SCNC: 13 MMOL/L — SIGNIFICANT CHANGE UP (ref 7–14)
AST SERPL-CCNC: 16 U/L — SIGNIFICANT CHANGE UP (ref 0–41)
BASOPHILS # BLD AUTO: 0.05 K/UL — SIGNIFICANT CHANGE UP (ref 0–0.2)
BASOPHILS NFR BLD AUTO: 0.6 % — SIGNIFICANT CHANGE UP (ref 0–1)
BILIRUB SERPL-MCNC: 1.3 MG/DL — HIGH (ref 0.2–1.2)
BUN SERPL-MCNC: 34 MG/DL — HIGH (ref 10–20)
CALCIUM SERPL-MCNC: 9.6 MG/DL — SIGNIFICANT CHANGE UP (ref 8.4–10.5)
CHLORIDE SERPL-SCNC: 106 MMOL/L — SIGNIFICANT CHANGE UP (ref 98–110)
CO2 SERPL-SCNC: 24 MMOL/L — SIGNIFICANT CHANGE UP (ref 17–32)
CREAT SERPL-MCNC: 0.9 MG/DL — SIGNIFICANT CHANGE UP (ref 0.7–1.5)
CULTURE RESULTS: SIGNIFICANT CHANGE UP
EGFR: 64 ML/MIN/1.73M2 — SIGNIFICANT CHANGE UP
EOSINOPHIL # BLD AUTO: 0.29 K/UL — SIGNIFICANT CHANGE UP (ref 0–0.7)
EOSINOPHIL NFR BLD AUTO: 3.2 % — SIGNIFICANT CHANGE UP (ref 0–8)
GLUCOSE BLDC GLUCOMTR-MCNC: 215 MG/DL — HIGH (ref 70–99)
GLUCOSE BLDC GLUCOMTR-MCNC: 95 MG/DL — SIGNIFICANT CHANGE UP (ref 70–99)
GLUCOSE SERPL-MCNC: 103 MG/DL — HIGH (ref 70–99)
HCT VFR BLD CALC: 38.5 % — SIGNIFICANT CHANGE UP (ref 37–47)
HGB BLD-MCNC: 12.3 G/DL — SIGNIFICANT CHANGE UP (ref 12–16)
IMM GRANULOCYTES NFR BLD AUTO: 0.3 % — SIGNIFICANT CHANGE UP (ref 0.1–0.3)
LYMPHOCYTES # BLD AUTO: 1.31 K/UL — SIGNIFICANT CHANGE UP (ref 1.2–3.4)
LYMPHOCYTES # BLD AUTO: 14.6 % — LOW (ref 20.5–51.1)
MAGNESIUM SERPL-MCNC: 1.9 MG/DL — SIGNIFICANT CHANGE UP (ref 1.8–2.4)
MCHC RBC-ENTMCNC: 26.7 PG — LOW (ref 27–31)
MCHC RBC-ENTMCNC: 31.9 G/DL — LOW (ref 32–37)
MCV RBC AUTO: 83.7 FL — SIGNIFICANT CHANGE UP (ref 81–99)
MONOCYTES # BLD AUTO: 0.81 K/UL — HIGH (ref 0.1–0.6)
MONOCYTES NFR BLD AUTO: 9 % — SIGNIFICANT CHANGE UP (ref 1.7–9.3)
NEUTROPHILS # BLD AUTO: 6.51 K/UL — HIGH (ref 1.4–6.5)
NEUTROPHILS NFR BLD AUTO: 72.3 % — SIGNIFICANT CHANGE UP (ref 42.2–75.2)
NRBC # BLD: 0 /100 WBCS — SIGNIFICANT CHANGE UP (ref 0–0)
PHOSPHATE SERPL-MCNC: 3.4 MG/DL — SIGNIFICANT CHANGE UP (ref 2.1–4.9)
PLATELET # BLD AUTO: 228 K/UL — SIGNIFICANT CHANGE UP (ref 130–400)
PMV BLD: 10.9 FL — HIGH (ref 7.4–10.4)
POTASSIUM SERPL-MCNC: 4.1 MMOL/L — SIGNIFICANT CHANGE UP (ref 3.5–5)
POTASSIUM SERPL-SCNC: 4.1 MMOL/L — SIGNIFICANT CHANGE UP (ref 3.5–5)
PROT SERPL-MCNC: 6.4 G/DL — SIGNIFICANT CHANGE UP (ref 6–8)
RBC # BLD: 4.6 M/UL — SIGNIFICANT CHANGE UP (ref 4.2–5.4)
RBC # FLD: 15 % — HIGH (ref 11.5–14.5)
SODIUM SERPL-SCNC: 143 MMOL/L — SIGNIFICANT CHANGE UP (ref 135–146)
SPECIMEN SOURCE: SIGNIFICANT CHANGE UP
T3FREE SERPL-MCNC: 2.08 PG/ML — SIGNIFICANT CHANGE UP (ref 2–4.4)
T4 FREE SERPL-MCNC: 1.1 NG/DL — SIGNIFICANT CHANGE UP (ref 0.9–1.8)
TSH SERPL-MCNC: 1.09 UIU/ML — SIGNIFICANT CHANGE UP (ref 0.27–4.2)
WBC # BLD: 9 K/UL — SIGNIFICANT CHANGE UP (ref 4.8–10.8)
WBC # FLD AUTO: 9 K/UL — SIGNIFICANT CHANGE UP (ref 4.8–10.8)

## 2024-03-09 PROCEDURE — 99239 HOSP IP/OBS DSCHRG MGMT >30: CPT | Mod: GC

## 2024-03-09 PROCEDURE — 71046 X-RAY EXAM CHEST 2 VIEWS: CPT | Mod: 26

## 2024-03-09 PROCEDURE — 99233 SBSQ HOSP IP/OBS HIGH 50: CPT

## 2024-03-09 RX ORDER — LOSARTAN/HYDROCHLOROTHIAZIDE 100MG-25MG
1 TABLET ORAL
Refills: 0 | DISCHARGE

## 2024-03-09 RX ORDER — LOSARTAN POTASSIUM 100 MG/1
1 TABLET, FILM COATED ORAL
Qty: 30 | Refills: 0
Start: 2024-03-09 | End: 2024-04-07

## 2024-03-09 RX ADMIN — Medication 100 MILLIGRAM(S): at 09:12

## 2024-03-09 RX ADMIN — Medication 81 MILLIGRAM(S): at 12:02

## 2024-03-09 RX ADMIN — Medication 650 MILLIGRAM(S): at 09:09

## 2024-03-09 RX ADMIN — LOSARTAN POTASSIUM 50 MILLIGRAM(S): 100 TABLET, FILM COATED ORAL at 05:17

## 2024-03-09 RX ADMIN — Medication 5 MILLIGRAM(S): at 05:17

## 2024-03-09 RX ADMIN — Medication 2: at 12:03

## 2024-03-09 NOTE — DISCHARGE NOTE PROVIDER - HOSPITAL COURSE
83y Female with PMH of HTN, HLD, DM, s/p cholecystectomy who presents to the ED s/p syncopal episode.     #Syncope with fall  #Pause on MCOT  - CT head No acute intracranial pathology or hemorrhage  - CT cervical spin no acute fracture, multilevel degenerative changes  - s/p PPM    #HTN  - cw home losartan  - D/c HCTZ        83y Female with PMH of HTN, HLD, DM, s/p cholecystectomy who presents to the ED s/p syncopal episode. Patient reports she was walking in bedroom when she felt lightheaded and nauseous then syncopized, hitting her head on a cardboard box. Patient's son heard the fall and found her on the floor, reported patient awoke almost immediately. Patient recalls the event and does not report any confusion post event. Denies any bowel/bladder incontinence, no tongue biting. Patient is being followed by cardiology as she had a syncopal event last month, follows with Dr. Lan and Dr. La, found to have symptomatic bradycardia and was scheduled for pacemaker next tuesday. In the ED, VSS, trop 25--> 25, CT head/neck negative for acute pathology, EKG LVH with repolarization abnormality. Cardiology was consulted for syncope and pauses on  MCOT previously, recommending EP for PPM placement. Patient admitted to med/tele.     A/P:   Syncope and Fall due to bradyarrhythmia:   Sick Sinus Syndrome:   Patient was scheduled for outpatient pacemaker after MCOT revealed bradycardia and sinus pauses  She had episode of syncope, fall and head trauma at home.   Trauma work up negative  CT head No acute intracranial pathology or hemorrhage  CT cervical spin no acute fracture, multilevel degenerative changes  EKG LVH, NST changes on lateral and inferior leads.    trop 25--> 25  Telemetry showed sinus pause ~3 seconds.   s/p pacemaker placement on 3/8/24  EP follow up.     HTN: Continue Losartan, hold HCTZ as BP is on lower side.   HLD: on Lipitor.   DM 2: continue home meds.

## 2024-03-09 NOTE — PROGRESS NOTE ADULT - ASSESSMENT
83y Female with PMH of HTN, HLD, DM, s/p cholecystectomy who presents to the ED s/p syncopal episode. Patient reports she was walking in bedroom when she felt lightheaded and nauseous then syncopized, hitting her head on a cardboard box. Patient's son heard the fall and found her on the floor, reported patient awoke almost immediately. Patient recalls the event and does not report any confusion post event. Denies any bowel/bladder incontinence, no tongue biting. Patient is being followed by cardiology as she had a syncopal event last month, follows with Dr. Lan and Dr. La, found to have symptomatic bradycardia and was scheduled for pacemaker next tuesday. In the ED, VSS, trop 25--> 25, CT head/neck negative for acute pathology, EKG LVH with repolarization abnormality. Cardiology was consulted for syncope and pauses on  MCOT previously, recommending EP for PPM placement. Patient admitted to med/tele.       #Fall - likely cardiac in nature  #Pause on MCOT  - CT head No acute intracranial pathology or hemorrhage  - CT cervical spin no acute fracture, multilevel degenerative changes  - EKG wiht LVH with repolarization abnormality R in AVL  - trop 25--> 25  - C/w telemetry monitoring  - Cardiology consult appreciated  - Patient with pause on MCOT, originally planned for PPM next week  - EP consult for PPM eval, will get PPM as per EP   - keep cardiac telemonitoring   - atropine at bedside    #DM  #DL  - hold home pioglitazone, metformin  - started on SS. monitor FS (goal 140-180)  - cw statin    #HTN  - cw home losartan and HCTZ    #Progress Note Handoff  Pending (specify):  pending PPM placement    Family discussion: house staff updated pt family  Disposition: cardiac telemonitoring   Decision to admit the pt is based on acuity as above   
A/P   Patient s/p DC SJM PPM implant on 3/8/2024 by Dr. Lopez.     - CXR is done, unremarkable. Leads are confirmed at the right position.   - Device interrogation done, PPM functions well.   - Abx prophylaxis as per CTS team  - FU in the EP office for the next device check will be arranged in 1 month with Dr. La    No Heavy lifting >5 lbs. Do not raise your left arm above shoulder level for 4-6 weeks.   No driving for 2 weeks  No shower, bathtub, no wetting device site until the post op appointment with Dr. Lopez  Wound and dressing instructions per Dr Lopez team
Patient is a 83y Female with PMH of HTN, HLD, DM, s/p cholecystectomy who presents to the ED s/p syncopal episode. Patient reports she was walking in bedroom when she felt lightheaded and nauseous then syncopized, hitting her head on a cardboard box. Patient's son heard the fall and found her on the floor, reported patient awoke almost immediately. Patient recalls the event and does not report any confusion post event. Denies any bowel/bladder incontinence, no tongue biting. Patient is being followed by cardiology as she had a syncopal event last month, follows with Dr. Lan and Dr. La, found to have symptomatic bradycardia and was scheduled for pacemaker next tuesday. In the ED, VSS, trop 25--> 25, CT head/neck negative for acute pathology, EKG LVH with repolarization abnormality. Cardiology was consulted for syncope and pauses on  MCOT previously, recommending EP for PPM placement. Patient admitted to med/tele.     #Fall - likely cardiac in nature  #Pause on MCOT  - CT head No acute intracranial pathology or hemorrhage  - CT cervical spin no acute fracture, multilevel degenerative changes  - EKG wiht LVH with repolarization abnormality R in AVL  - trop 25--> 25  - C/w telemetry monitoring  - Cardiology consult appreciated  - Patient with pause on MCOT, originally planned for PPM next week  - EP consult for PPM eval appreciated, pt planned for PPM today    #DM  #DL  - hold home pioglitazone, metformin  - started on SS. monitor FS (goal 140-180)  - cw statin    #HTN  - cw home losartan and HCTZ    Med Rec was confirmed with [facility]. Pleas call [facility] in AM to confirm patient was not certain on Losartan and antidiabetic regimen.    #MISC  - DVT PPx: Lovenox 40 qd  - GI PPx: not indicated  - Diet: DASH, Carbs restricted, low salt  - Activity; IAT  - Dispo: Med tele    
Syd La    83y Female with h/o HTN, HLD, DM2,,with symptomatic bradycardia and pauses and 2 syncopal episodes    symptomatic bradycardia due to sick sinus syndrome  syncope  HTN, HLD, DM      Plan:  Plan for PPM placement today with DR. Lopez  con't tele  Maintain NPO
 83y Female with PMH of HTN, HLD, DM, s/p cholecystectomy who presents to the ED s/p syncopal episode. Patient reports she was walking in bedroom when she felt lightheaded and nauseous then syncopized, hitting her head on a cardboard box. Patient's son heard the fall and found her on the floor, reported patient awoke almost immediately. Patient recalls the event and does not report any confusion post event. Denies any bowel/bladder incontinence, no tongue biting. Patient is being followed by cardiology as she had a syncopal event last month, follows with Dr. Lan and Dr. La, found to have symptomatic bradycardia and was scheduled for pacemaker next tuesday. In the ED, VSS, trop 25--> 25, CT head/neck negative for acute pathology, EKG LVH with repolarization abnormality. Cardiology was consulted for syncope and pauses on  MCOT previously, recommending EP for PPM placement. Patient admitted to med/tele.     A/P:   Syncope and Fall due to bradyarrhythmia:   Sick Sinus Syndrome:   Patient was scheduled for outpatient pacemaker after MCOT revealed bradycardia and sinus pauses  She had episode of syncope, fall and head trauma at home.   Trauma work up negative  CT head No acute intracranial pathology or hemorrhage  CT cervical spin no acute fracture, multilevel degenerative changes  EKG LVH, NST changes on lateral and inferior leads.    trop 25--> 25  Telemetry showed sinus pause ~3 seconds.   s/p pacemaker placement on 3/8/24  EP follow up.     HTN: Continue Losartan, hold HCTZ as BP is on lower side.   HLD: on Lipitor.   DM 2: continue home meds.     Discharge home today.

## 2024-03-09 NOTE — DISCHARGE NOTE PROVIDER - NSDCMRMEDTOKEN_GEN_ALL_CORE_FT
aspirin 81 mg oral delayed release tablet: 1 tab(s) orally once a day  atorvastatin 20 mg oral tablet: 1 tab(s) orally once a day  losartan 50 mg oral tablet: 1 tab(s) orally once a day  metFORMIN 500 mg oral tablet, extended release: 1 tab(s) orally once a day  oxybutynin 5 mg oral tablet: 1 tab(s) orally 2 times a day  pioglitazone 15 mg oral tablet: 1 tab(s) orally once a day

## 2024-03-09 NOTE — DISCHARGE NOTE PROVIDER - PROVIDER TOKENS
PROVIDER:[TOKEN:[[id number]],FOLLOWUP:[1 week]],PROVIDER:[TOKEN:[[id number]],FOLLOWUP:[1 week]]

## 2024-03-09 NOTE — DISCHARGE NOTE PROVIDER - CARE PROVIDER_API CALL
Phani Agustin  [facility]  [address] [place] [id number]  Phone: [phone]  Fax: [phone]  Follow Up Time: 1 week    Edouard Hendrix  Cardiac Electrophysiology  [address] [place] [address], [facility] [address]  [place] [address]  Phone: [phone]  Fax: [phone]  Follow Up Time: 1 week

## 2024-03-09 NOTE — PROGRESS NOTE ADULT - SUBJECTIVE AND OBJECTIVE BOX
HPI:  Patient is a 83y Female with PMH of HTN, HLD, DM, s/p cholecystectomy who presents to the ED s/p syncopal episode. Patient reports she was walking in bedroom when she felt lightheaded and nauseous then syncopized. She fell hitting her head on a cardboard box. Patient's son heard the fall and found her on the floor, reported patient awoke almost immediately. Patient recalls the event and does not report any confusion post event. Denies any bowel/bladder incontinence, no tongue biting. Patient is being followed by cardiology as she had a syncopal event last month, MCOT was placed for 2 weeks showing a pause, follows with Dr. Lan and Dr. La, found to have symptomatic bradycardia and was scheduled for pacemaker next Tuesday. Currently patient is feeling well and denies any fever, chills, chest pain, SOB, visual changes, dizziness, headache, N/V/D, abdominal pain, or urinary sx.    In the ED, afebrile 97.8F, HR 61-78, /63, satting 99% on RA. Labs showed WBC 12.7, trop 25--> 25, CT head and neck no acute, EKG: Left ventricular hypertrophy with repolarization abnormality ( R in aVL ). Cardiology was consulted for syncope and pauses on  MCOT previously, recommending EP for PPM placement. Patient is admitted to [facility] for management of syncope/pacemaker implantation.    (06 Mar 2024 23:32)    Primary diagnosis of Syncope    24H events:    Today is hospital day 2d. This morning patient was seen and examined at bedside, resting comfortably in bed.    No acute or major events overnight.      PAST MEDICAL & SURGICAL HISTORY  Hypertension    Hyperlipidemia    Diabetes mellitus    History of cholecystectomy    S/P cataract surgery      SOCIAL HISTORY:  Social History:  Patient denies any tobacco or illicit drug use. Reports social alcohol use. (06 Mar 2024 23:32)      ALLERGIES:  No Known Allergies    MEDICATIONS:  STANDING MEDICATIONS  aspirin enteric coated 81 milliGRAM(s) Oral daily  atorvastatin 20 milliGRAM(s) Oral at bedtime  dextrose 5%. 1000 milliLiter(s) IV Continuous <Continuous>  dextrose 5%. 1000 milliLiter(s) IV Continuous <Continuous>  dextrose 50% Injectable 25 Gram(s) IV Push once  dextrose 50% Injectable 12.5 Gram(s) IV Push once  dextrose 50% Injectable 25 Gram(s) IV Push once  glucagon  Injectable 1 milliGRAM(s) IntraMuscular once  insulin lispro (ADMELOG) corrective regimen sliding scale   SubCutaneous three times a day before meals  losartan 50 milliGRAM(s) Oral daily  oxybutynin 5 milliGRAM(s) Oral two times a day    PRN MEDICATIONS  acetaminophen     Tablet .. 650 milliGRAM(s) Oral every 6 hours PRN  atropine Injectable 0.5 milliGRAM(s) IntraMuscular once PRN  dextrose Oral Gel 15 Gram(s) Oral once PRN  melatonin 3 milliGRAM(s) Oral at bedtime PRN    VITALS:   T(F): 96.5  HR: 62  BP: 117/56  RR: 18  SpO2: --    PHYSICAL EXAM:  GENERAL:   (X ) NAD, lying in bed comfortably     (  ) obtunded     (  ) lethargic     (  ) somnolent    HEAD:   ( X ) Atraumatic     (  ) hematoma     (  ) laceration (specify location:       )     NECK:  ( X ) Supple     (  ) neck stiffness     (  ) nuchal rigidity     (  )  no JVD     (  ) JVD present ( -- cm)    HEART:  Rate -->     (  ) normal rate     ( X ) bradycardic     (  ) tachycardic  Rhythm -->     (  ) regular     (  ) regularly irregular     (  ) irregularly irregular  Murmurs -->     (  ) normal s1s2     (  ) systolic murmur     (  ) diastolic murmur     (  ) continuous murmur      (  ) S3 present     (  ) S4 present    LUNGS:   ( X )Unlabored respirations     (  ) tachypnea  (  ) B/L air entry     (  ) decreased breath sounds in:  (location     )    (X  ) no adventitious sound     (  ) crackles     (  ) wheezing      (  ) rhonchi      (specify location:       )  (  ) chest wall tenderness (specify location:       )    ABDOMEN:   (X  ) Soft     (  ) tense   |   (  ) nondistended     (  ) distended   |   (  ) +BS     (  ) hypoactive bowel sounds     (  ) hyperactive bowel sounds  ( X ) nontender     (  ) RUQ tenderness     (  ) RLQ tenderness     (  ) LLQ tenderness     (  ) epigastric tenderness     (  ) diffuse tenderness  (  ) Splenomegaly      (  ) Hepatomegaly      (  ) Jaundice     (  ) ecchymosis     EXTREMITIES:  (X  ) Normal     (  ) Rash     (  ) ecchymosis     (  ) varicose veins      (  ) pitting edema     (  ) non-pitting edema   (  ) ulceration     (  ) gangrene:     (location:     )    NERVOUS SYSTEM:    ( X ) A&Ox3     (  ) confused     (  ) lethargic  CN II-XII:     (  ) Intact     (  ) deficits found     (Specify:     )   Upper extremities:     (  ) no sensorimotor deficits     (  ) weakness     (  ) loss of proprioception/vibration     (  ) loss of touch/temperature (specify:    )  Lower extremities:     (  ) no sensorimotor deficits     (  ) weakness     (  ) loss of proprioception/vibration     (  ) loss of touch/temperature (specify:    )    SKIN:   ( X ) No rashes or lesions     (  ) maculopapular rash     (  ) pustules     (  ) vesicles     (  ) ulcer     (  ) ecchymosis     (specify location:     )    AMPAC score:    (  ) Indwelling Chu Catheter:   Date insterted:    Reason (  ) Critical illness     (  ) urinary retention    (  ) Accurate Ins/Outs Monitoring     (  ) CMO patient    (  ) Central Line:   Date inserted:  Location: (  ) Right IJ     (  ) Left IJ     (  ) Right Fem     (  ) Left Fem    (  ) SPC        (  ) pigtail       (  ) PEG tube       (  ) colostomy       (  ) jejunostomy  (  ) U-Dall    LABS:                        12.1   9.14  )-----------( 222      ( 08 Mar 2024 07:51 )             37.1     03-08    138  |  105  |  32<H>  ----------------------------<  101<H>  4.2   |  24  |  1.1    Ca    9.5      08 Mar 2024 07:51  Mg     2.0     03-08    TPro  6.3  /  Alb  3.8  /  TBili  1.3<H>  /  DBili  x   /  AST  15  /  ALT  9   /  AlkPhos  96  03-08    PT/INR - ( 07 Mar 2024 11:27 )   PT: 12.00 sec;   INR: 1.05 ratio         PTT - ( 07 Mar 2024 11:27 )  PTT:33.4 sec  Urinalysis Basic - ( 08 Mar 2024 07:51 )    Color: x / Appearance: x / SG: x / pH: x  Gluc: 101 mg/dL / Ketone: x  / Bili: x / Urobili: x   Blood: x / Protein: x / Nitrite: x   Leuk Esterase: x / RBC: x / WBC x   Sq Epi: x / Non Sq Epi: x / Bacteria: x      RADIOLOGY:          
INTERVAL HPI/OVERNIGHT EVENTS:  No new events. Pt has no complaints    MEDICATIONS  (STANDING):  aspirin enteric coated 81 milliGRAM(s) Oral daily  atorvastatin 20 milliGRAM(s) Oral at bedtime  dextrose 5%. 1000 milliLiter(s) (100 mL/Hr) IV Continuous <Continuous>  dextrose 5%. 1000 milliLiter(s) (50 mL/Hr) IV Continuous <Continuous>  dextrose 50% Injectable 25 Gram(s) IV Push once  dextrose 50% Injectable 12.5 Gram(s) IV Push once  dextrose 50% Injectable 25 Gram(s) IV Push once  glucagon  Injectable 1 milliGRAM(s) IntraMuscular once  insulin lispro (ADMELOG) corrective regimen sliding scale   SubCutaneous three times a day before meals  losartan 50 milliGRAM(s) Oral daily  oxybutynin 5 milliGRAM(s) Oral two times a day    MEDICATIONS  (PRN):  acetaminophen     Tablet .. 650 milliGRAM(s) Oral every 6 hours PRN Temp greater or equal to 38C (100.4F), Mild Pain (1 - 3)  atropine Injectable 0.5 milliGRAM(s) IntraMuscular once PRN HR < 40 and symptoms or SBP <90  dextrose Oral Gel 15 Gram(s) Oral once PRN Blood Glucose LESS THAN 70 milliGRAM(s)/deciliter  melatonin 3 milliGRAM(s) Oral at bedtime PRN Insomnia      Allergies    No Known Allergies    Intolerances      REVIEW OF SYSTEMS  negative    Vital Signs Last 24 Hrs  T(C): 36.1 (08 Mar 2024 12:30), Max: 36.4 (07 Mar 2024 20:10)  T(F): 96.9 (08 Mar 2024 12:30), Max: 97.6 (07 Mar 2024 20:10)  HR: 92 (08 Mar 2024 12:30) (62 - 92)  BP: 141/67 (08 Mar 2024 12:30) (104/58 - 141/67)  BP(mean): --  RR: 18 (08 Mar 2024 12:30) (18 - 18)  SpO2: --        03-07-24 @ 07:01  -  03-08-24 @ 07:00  --------------------------------------------------------  IN: 601 mL / OUT: 350 mL / NET: 251 mL    03-08-24 @ 07:01  -  03-08-24 @ 13:01  --------------------------------------------------------  IN: 0 mL / OUT: 0 mL / NET: 0 mL      Physical Exam    GENERAL: In no apparent distress, well nourished, and hydrated.  HEART: Regular rate and rhythm; No murmurs, rubs, or gallops.  PULMONARY: Clear to auscultation and perfusion.  No rales, wheezing, or rhonchi bilaterally.  ABDOMEN: Soft, Nontender, Nondistended; Bowel sounds present  EXTREMITIES:  2+ Peripheral Pulses, No clubbing, cyanosis, or edema  NEUROLOGICAL: Grossly nonfocal    LABS:                        12.1   9.14  )-----------( 222      ( 08 Mar 2024 07:51 )             37.1     03-08    138  |  105  |  32<H>  ----------------------------<  101<H>  4.2   |  24  |  1.1    Ca    9.5      08 Mar 2024 07:51  Mg     2.0     03-08    TPro  6.3  /  Alb  3.8  /  TBili  1.3<H>  /  DBili  x   /  AST  15  /  ALT  9   /  AlkPhos  96  03-08    PT/INR - ( 07 Mar 2024 11:27 )   PT: 12.00 sec;   INR: 1.05 ratio         PTT - ( 07 Mar 2024 11:27 )  PTT:33.4 sec  Urinalysis Basic - ( 08 Mar 2024 07:51 )    Color: x / Appearance: x / SG: x / pH: x  Gluc: 101 mg/dL / Ketone: x  / Bili: x / Urobili: x   Blood: x / Protein: x / Nitrite: x   Leuk Esterase: x / RBC: x / WBC x   Sq Epi: x / Non Sq Epi: x / Bacteria: x        RADIOLOGY & ADDITIONAL TESTS:  
  SAUD VILLAVICENCIO  83y  Female      Patient is a 83y old  Female who presents with a chief complaint of syncope (09 Mar 2024 10:53)      INTERVAL HPI/OVERNIGHT EVENTS:  She feels ok, she denies chest pain, she went for pacemaker yesterday  Vital Signs Last 24 Hrs  T(C): 36.1 (09 Mar 2024 05:06), Max: 36.4 (08 Mar 2024 17:35)  T(F): 96.9 (09 Mar 2024 05:06), Max: 97.6 (08 Mar 2024 17:35)  HR: 60 (09 Mar 2024 05:06) (60 - 95)  BP: 111/53 (09 Mar 2024 05:06) (109/55 - 141/67)  BP(mean): 78 (08 Mar 2024 13:45) (78 - 78)  RR: 18 (09 Mar 2024 05:06) (15 - 20)  SpO2: 96% (08 Mar 2024 18:20) (96% - 99%)    Parameters below as of 08 Mar 2024 18:20  Patient On (Oxygen Delivery Method): room air          03-08-24 @ 07:01  -  03-09-24 @ 07:00  --------------------------------------------------------  IN: 541 mL / OUT: 800 mL / NET: -259 mL            Consultant(s) Notes Reviewed:  [x ] YES  [ ] NO          MEDICATIONS  (STANDING):  aspirin enteric coated 81 milliGRAM(s) Oral daily  atorvastatin 20 milliGRAM(s) Oral at bedtime  ceFAZolin   IVPB 1000 milliGRAM(s) IV Intermittent every 8 hours  dextrose 5%. 1000 milliLiter(s) (100 mL/Hr) IV Continuous <Continuous>  dextrose 5%. 1000 milliLiter(s) (50 mL/Hr) IV Continuous <Continuous>  dextrose 50% Injectable 25 Gram(s) IV Push once  dextrose 50% Injectable 12.5 Gram(s) IV Push once  dextrose 50% Injectable 25 Gram(s) IV Push once  glucagon  Injectable 1 milliGRAM(s) IntraMuscular once  insulin lispro (ADMELOG) corrective regimen sliding scale   SubCutaneous three times a day before meals  losartan 50 milliGRAM(s) Oral daily  oxybutynin 5 milliGRAM(s) Oral two times a day    MEDICATIONS  (PRN):  acetaminophen     Tablet .. 650 milliGRAM(s) Oral every 6 hours PRN Temp greater or equal to 38C (100.4F), Mild Pain (1 - 3)  atropine Injectable 0.5 milliGRAM(s) IntraMuscular once PRN HR < 40 and symptoms or SBP <90  dextrose Oral Gel 15 Gram(s) Oral once PRN Blood Glucose LESS THAN 70 milliGRAM(s)/deciliter  melatonin 3 milliGRAM(s) Oral at bedtime PRN Insomnia      LABS                          12.3   9.00  )-----------( 228      ( 09 Mar 2024 07:42 )             38.5     03-09    143  |  106  |  34<H>  ----------------------------<  103<H>  4.1   |  24  |  0.9    Ca    9.6      09 Mar 2024 07:42  Phos  3.4     03-09  Mg     1.9     03-09    TPro  6.4  /  Alb  3.7  /  TBili  1.3<H>  /  DBili  x   /  AST  16  /  ALT  7   /  AlkPhos  87  03-09      Urinalysis Basic - ( 09 Mar 2024 07:42 )    Color: x / Appearance: x / SG: x / pH: x  Gluc: 103 mg/dL / Ketone: x  / Bili: x / Urobili: x   Blood: x / Protein: x / Nitrite: x   Leuk Esterase: x / RBC: x / WBC x   Sq Epi: x / Non Sq Epi: x / Bacteria: x      PT/INR - ( 07 Mar 2024 11:27 )   PT: 12.00 sec;   INR: 1.05 ratio         PTT - ( 07 Mar 2024 11:27 )  PTT:33.4 sec  Lactate Trend        CAPILLARY BLOOD GLUCOSE      POCT Blood Glucose.: 95 mg/dL (09 Mar 2024 07:49)        RADIOLOGY & ADDITIONAL TESTS:    Imaging Personally Reviewed:  [ ] YES  [ ] NO    HEALTH ISSUES - PROBLEM Dx:          PHYSICAL EXAM:  GENERAL: NAD, well-developed.  HEAD:  Atraumatic, Normocephalic.  EYES: EOMI, PERRLA, conjunctiva and sclera clear.  NECK: Supple, No JVD.  CHEST/LUNG: Clear to auscultation bilaterally; No wheeze.  HEART: Regular rate and rhythm; S1 S2.   ABDOMEN: Soft, Nontender, Nondistended; Bowel sounds present.  EXTREMITIES:  2+ Peripheral Pulses, No clubbing, cyanosis, or edema.  PSYCH: AAOx3.  NEUROLOGY: non-focal.  SKIN: No rashes or lesions.
INTERVAL HPI/OVERNIGHT EVENTS:    Patient s/p DC SJM PPM implant on 3/8/2024 by Dr. Lopez.   No event over night. Pt without complains    MEDICATIONS  (STANDING):  aspirin enteric coated 81 milliGRAM(s) Oral daily  atorvastatin 20 milliGRAM(s) Oral at bedtime  ceFAZolin   IVPB 1000 milliGRAM(s) IV Intermittent every 8 hours  dextrose 5%. 1000 milliLiter(s) (100 mL/Hr) IV Continuous <Continuous>  dextrose 5%. 1000 milliLiter(s) (50 mL/Hr) IV Continuous <Continuous>  dextrose 50% Injectable 25 Gram(s) IV Push once  dextrose 50% Injectable 12.5 Gram(s) IV Push once  dextrose 50% Injectable 25 Gram(s) IV Push once  glucagon  Injectable 1 milliGRAM(s) IntraMuscular once  insulin lispro (ADMELOG) corrective regimen sliding scale   SubCutaneous three times a day before meals  losartan 50 milliGRAM(s) Oral daily  oxybutynin 5 milliGRAM(s) Oral two times a day    MEDICATIONS  (PRN):  acetaminophen     Tablet .. 650 milliGRAM(s) Oral every 6 hours PRN Temp greater or equal to 38C (100.4F), Mild Pain (1 - 3)  atropine Injectable 0.5 milliGRAM(s) IntraMuscular once PRN HR < 40 and symptoms or SBP <90  dextrose Oral Gel 15 Gram(s) Oral once PRN Blood Glucose LESS THAN 70 milliGRAM(s)/deciliter  melatonin 3 milliGRAM(s) Oral at bedtime PRN Insomnia      Allergies    No Known Allergies    Intolerances          Vital Signs Last 24 Hrs  T(C): 36.1 (09 Mar 2024 12:39), Max: 36.4 (08 Mar 2024 17:35)  T(F): 97 (09 Mar 2024 12:39), Max: 97.6 (08 Mar 2024 17:35)  HR: 60 (09 Mar 2024 12:39) (60 - 95)  BP: 105/53 (09 Mar 2024 12:39) (105/53 - 133/66)  BP(mean): --  RR: 18 (09 Mar 2024 12:39) (15 - 20)  SpO2: 96% (08 Mar 2024 18:20) (96% - 97%)    Parameters below as of 08 Mar 2024 18:20  Patient On (Oxygen Delivery Method): room air        GENERAL: In no apparent distress, well nourished, and hydrated.  HEART: Regular rate and rhythm; No murmurs, rubs, or gallops.  	Wound healing well; No hematoma; no bleeding  PULMONARY: Clear to auscultation and perfusion.  No rales, wheezing, or rhonchi bilaterally.  ABDOMEN: Soft, Nontender, Nondistended; Bowel sounds present  EXTREMITIES:  2+ Peripheral Pulses, No clubbing, cyanosis, or edema  NEUROLOGICAL: Grossly nonfocal        RADIOLOGY & ADDITIONAL TESTS:  EXAM:  XR CHEST PA LAT 2V   ORDERED BY: THOMAS ROTHMAN     PROCEDURE DATE:  03/09/2024      INTERPRETATION:  CLINICAL HISTORY / REASON FOR EXAM: Post-operative   evaluation.    COMPARISON: Chest radiograph from March 8, 2024.    TECHNIQUE/POSITIONING: Satisfactory. Two images, PA and lateral   radiographs.    FINDINGS:    SUPPORT DEVICES: Pacing device overlies the left hemithorax.    CARDIAC/MEDIASTINUM/HILUM: Unchanged cardiac silhouette.    LUNG PARENCHYMA/PLEURA: No focal consolidation or pleural effusion.   Stable focal prominence of the right hemidiaphragm. No pneumothorax.    SKELETON/SOFT TISSUES: Unchanged.    IMPRESSION:    No radiographic evidence of acute cardiopulmonary disease.    --- End of Report ---    
Patient is a 83y old  Female who presents with a chief complaint of syncope (03-07-24)      Pt seen and examined at bedside. No CP or SOB.      PAST MEDICAL & SURGICAL HISTORY:  Hypertension    Hyperlipidemia    Diabetes mellitus    History of cholecystectomy    S/P cataract surgery        VITAL SIGNS (Last 24 hrs):  T(C): 36.8 (03-07-24 @ 07:30), Max: 36.8 (03-07-24 @ 07:30)  HR: 34 (03-07-24 @ 10:43) (34 - 76)  BP: 115/55 (03-07-24 @ 10:43) (105/56 - 136/63)  RR: 18 (03-07-24 @ 10:43) (18 - 18)  SpO2: 99% (03-07-24 @ 10:43) (97% - 99%)  Wt(kg): --  Daily     Daily     I&O's Summary      PHYSICAL EXAM:  GENERAL: NAD, well-developed  HEAD:  Atraumatic, Normocephalic  EYES: EOMI, PERRLA, conjunctiva and sclera clear  NECK: Supple, No JVD  CHEST/LUNG: Clear to auscultation bilaterally; No wheeze  HEART: Regular rate and rhythm; No murmurs, rubs, or gallops  ABDOMEN: Soft, Nontender, Nondistended; Bowel sounds present  EXTREMITIES:  2+ Peripheral Pulses, No clubbing, cyanosis, or edema  PSYCH: AAOx3  NEUROLOGY: non-focal  SKIN: No rashes or lesions    Labs Reviewed  Spoke to patient in regards to abnormal labs.    CBC Full  -  ( 07 Mar 2024 07:04 )  WBC Count : 8.35 K/uL  Hemoglobin : 11.3 g/dL  Hematocrit : 35.4 %  Platelet Count - Automated : 232 K/uL  Mean Cell Volume : 83.9 fL  Mean Cell Hemoglobin : 26.8 pg  Mean Cell Hemoglobin Concentration : 31.9 g/dL  Auto Neutrophil # : 5.43 K/uL  Auto Lymphocyte # : 1.72 K/uL  Auto Monocyte # : 0.83 K/uL  Auto Eosinophil # : 0.28 K/uL  Auto Basophil # : 0.06 K/uL  Auto Neutrophil % : 65.0 %  Auto Lymphocyte % : 20.6 %  Auto Monocyte % : 9.9 %  Auto Eosinophil % : 3.4 %  Auto Basophil % : 0.7 %    BMP:    03-07 @ 07:04    Blood Urea Nitrogen - 29  Calcium - 9.5  Carbond Dioxide - 24  Chloride - 104  Creatinine - 0.9  Glucose - 96  Potassium - 4.1  Sodium - 138      Hemoglobin A1c -   PT/INR - ( 07 Mar 2024 11:27 )   PT: 12.00 sec;   INR: 1.05 ratio         PTT - ( 07 Mar 2024 11:27 )  PTT:33.4 sec  Urine Culture:        COVID Labs  CRP:      D-Dimer:        Imaging reviewed independently and reviewed read  < from: CT Head No Cont (03.06.24 @ 16:39) >  IMPRESSION:    CT HEAD:  No acute intracranial pathology or hemorrhage. No acute calvarial   fracture.    MAXILLOFACIAL BONES:  No evidence of maxillofacial bony fracture. No evidence of soft tissue   injury.    CT CERVICAL SPINE:  No acute fracture or subluxation.    Multilevel degenerative changes as detailed above.        MEDICATIONS  (STANDING):  aspirin enteric coated 81 milliGRAM(s) Oral daily  atorvastatin 20 milliGRAM(s) Oral at bedtime  dextrose 5%. 1000 milliLiter(s) (100 mL/Hr) IV Continuous <Continuous>  dextrose 5%. 1000 milliLiter(s) (50 mL/Hr) IV Continuous <Continuous>  dextrose 50% Injectable 25 Gram(s) IV Push once  dextrose 50% Injectable 12.5 Gram(s) IV Push once  dextrose 50% Injectable 25 Gram(s) IV Push once  enoxaparin Injectable 40 milliGRAM(s) SubCutaneous every 24 hours  glucagon  Injectable 1 milliGRAM(s) IntraMuscular once  hydrochlorothiazide 12.5 milliGRAM(s) Oral daily  insulin lispro (ADMELOG) corrective regimen sliding scale   SubCutaneous three times a day before meals  losartan 50 milliGRAM(s) Oral daily  oxybutynin 5 milliGRAM(s) Oral two times a day    MEDICATIONS  (PRN):  acetaminophen     Tablet .. 650 milliGRAM(s) Oral every 6 hours PRN Temp greater or equal to 38C (100.4F), Mild Pain (1 - 3)  atropine Injectable 0.5 milliGRAM(s) IntraMuscular once PRN HR < 40 and symptoms or SBP <90  dextrose Oral Gel 15 Gram(s) Oral once PRN Blood Glucose LESS THAN 70 milliGRAM(s)/deciliter  melatonin 3 milliGRAM(s) Oral at bedtime PRN Insomnia

## 2024-03-09 NOTE — PROGRESS NOTE ADULT - NS ATTEND AMEND GEN_ALL_CORE FT
complex patient  sick sinus syndrome  Plan for pacemaker today
s/p PPM    Feels fine  CXR today  DC home

## 2024-03-09 NOTE — DISCHARGE NOTE PROVIDER - NSDCFUSCHEDAPPT_GEN_ALL_CORE_FT
[facility]  [facility] [address]  Scheduled Appointment: 03/29/2024

## 2024-03-09 NOTE — DISCHARGE NOTE PROVIDER - NSDCCPCAREPLAN_GEN_ALL_CORE_FT
PRINCIPAL DISCHARGE DIAGNOSIS  Diagnosis: Syncope  Assessment and Plan of Treatment: You came to hospital with syncope. Found to have pause on MCOT. You got pacemaker. Please follow up with cardiac EP on discharge

## 2024-03-10 VITALS
TEMPERATURE: 98 F | SYSTOLIC BLOOD PRESSURE: 135 MMHG | HEART RATE: 87 BPM | RESPIRATION RATE: 18 BRPM | DIASTOLIC BLOOD PRESSURE: 60 MMHG

## 2024-03-13 ENCOUNTER — APPOINTMENT (OUTPATIENT)
Dept: CARDIOTHORACIC SURGERY | Facility: CLINIC | Age: 84
End: 2024-03-13
Payer: MEDICARE

## 2024-03-13 DIAGNOSIS — I10 ESSENTIAL (PRIMARY) HYPERTENSION: ICD-10-CM

## 2024-03-15 ENCOUNTER — APPOINTMENT (OUTPATIENT)
Dept: ELECTROPHYSIOLOGY | Facility: CLINIC | Age: 84
End: 2024-03-15
Payer: MEDICARE

## 2024-03-15 ENCOUNTER — APPOINTMENT (OUTPATIENT)
Dept: CARDIOTHORACIC SURGERY | Facility: CLINIC | Age: 84
End: 2024-03-15
Payer: MEDICARE

## 2024-03-15 VITALS
WEIGHT: 163 LBS | OXYGEN SATURATION: 98 % | BODY MASS INDEX: 34.22 KG/M2 | HEIGHT: 58 IN | RESPIRATION RATE: 12 BRPM | HEART RATE: 65 BPM | TEMPERATURE: 97.4 F | SYSTOLIC BLOOD PRESSURE: 131 MMHG | DIASTOLIC BLOOD PRESSURE: 78 MMHG

## 2024-03-15 VITALS — DIASTOLIC BLOOD PRESSURE: 56 MMHG | SYSTOLIC BLOOD PRESSURE: 100 MMHG

## 2024-03-15 DIAGNOSIS — Z78.9 OTHER SPECIFIED HEALTH STATUS: ICD-10-CM

## 2024-03-15 DIAGNOSIS — Z80.9 FAMILY HISTORY OF MALIGNANT NEOPLASM, UNSPECIFIED: ICD-10-CM

## 2024-03-15 DIAGNOSIS — Z95.0 PRESENCE OF CARDIAC PACEMAKER: ICD-10-CM

## 2024-03-15 DIAGNOSIS — E78.5 HYPERLIPIDEMIA, UNSPECIFIED: ICD-10-CM

## 2024-03-15 DIAGNOSIS — Z48.89 ENCOUNTER FOR OTHER SPECIFIED SURGICAL AFTERCARE: ICD-10-CM

## 2024-03-15 PROCEDURE — 93280 PM DEVICE PROGR EVAL DUAL: CPT

## 2024-03-15 PROCEDURE — 99024 POSTOP FOLLOW-UP VISIT: CPT

## 2024-03-15 PROCEDURE — 93000 ELECTROCARDIOGRAM COMPLETE: CPT | Mod: 59

## 2024-03-15 NOTE — PROCEDURE
[No] : not [Pacemaker] : pacemaker [NSR] : normal sinus rhythm [Voltage: ___ volts] : Voltage was [unfilled] volts [DDD] : DDD [Longevity: ___ months] : The estimated remaining battery life is [unfilled] months [Threshold Testing Performed] : Threshold testing was performed [Lead Imp:  ___ohms] : lead impedance was [unfilled] ohms [Sensing Amplitude ___mv] : sensing amplitude was [unfilled] mv [___V @] : [unfilled] V [de-identified] : [facility] MRI [___ ms] : [unfilled] ms [de-identified] : 2405 [de-identified] : [id number] [de-identified] : 60/120 [de-identified] : 3/8/2024 [de-identified] : AP 74  <1% [de-identified] : 8.9-10.3 years

## 2024-03-15 NOTE — HISTORY OF PRESENT ILLNESS
[de-identified] : Cards: Dr. Carpio 83 years old female  with PMHx HTN, HLD, DM, syncope. Was on MCOT. noted to have pause and was scheduled for PPM implant already.  On March 6, patient had another syncopal episode and was taken to ED where he admitted and a pacemaker was implanted on 3/8 by Dr. Polo.for sick sinus syndrome  Presents today for wound check and device interrogation. She report had an episode of lightheadedness, upon getting up. Concern that it might be her heart rate again. She walks with a walker, most of the time is sedentary d/t osteoarthritis

## 2024-03-15 NOTE — PHYSICAL EXAM
[General Appearance - Well Developed] : well developed [Normal Appearance] : normal appearance [Well Groomed] : well groomed [General Appearance - Well Nourished] : well nourished [General Appearance - In No Acute Distress] : no acute distress [No Deformities] : no deformities [Heart Rate And Rhythm] : heart rate and rhythm were normal [Edema] : no peripheral edema present [Heart Sounds] : normal S1 and S2 [Respiration, Rhythm And Depth] : normal respiratory rhythm and effort [] : no respiratory distress [Clean] : clean [Left Infraclavicular] : left infraclavicular area [Dry] : dry [Bleeding] : no active bleeding [Healing Well] : healing well [Erythema] : not erythematous [Serous Drainage] : no serous drainage [Tender] : not tender [FreeTextEntry2] : with steri strips still intact

## 2024-03-15 NOTE — DISCUSSION/SUMMARY
[Excellent Pain Control] : has excellent pain control [Doing Well] : is doing well [No Sign of Infection] : is showing no signs of infection

## 2024-03-15 NOTE — CARDIOLOGY SUMMARY
[de-identified] : 3/15/2024 - 60 bpm sinus rhythm occ A paced, LVH [de-identified] : bradycardia and sinus pause [de-identified] : 3/8/2024 Salazar dual PPM

## 2024-03-15 NOTE — ASSESSMENT
[FreeTextEntry1] : Post pacemaker implant for sick sinus  with syncope Presents with her son and daughter, she uses walker # Device interrogation not dependent Normal function  # Inciison site- with steri strips- healing nicely no swelling, denies any pain  # Enrolled in remote monitoring   Automatic transmission every 90 days.  c/o lightheadedness -previous days-  no event seen likely low BP ( today is soft as well) or could be her sugar Patient is not keen to keeping herself hydrated. Daughter will implement and will encouraged her mother to drink more.  Stiffness of LE encouraged to do passive legs exercise while sitting and watching TV, like rolling a plastic bottle under her feet. Son, will see to it she will do it.  RTO  in 3 months I have also advised the patient to go to the nearest emergency room if he experiences any chest pain, dyspnea, syncope, or has any other compelling symptoms.   I spent a total of 45 mins on the encounter evaluating and treating the patient. Total time does not include the time spent on separate billable procedures performed on this DOS

## 2024-03-18 DIAGNOSIS — R00.1 BRADYCARDIA, UNSPECIFIED: ICD-10-CM

## 2024-03-18 DIAGNOSIS — E78.5 HYPERLIPIDEMIA, UNSPECIFIED: ICD-10-CM

## 2024-03-18 DIAGNOSIS — R55 SYNCOPE AND COLLAPSE: ICD-10-CM

## 2024-03-18 DIAGNOSIS — E11.9 TYPE 2 DIABETES MELLITUS WITHOUT COMPLICATIONS: ICD-10-CM

## 2024-03-18 DIAGNOSIS — Z79.84 LONG TERM (CURRENT) USE OF ORAL HYPOGLYCEMIC DRUGS: ICD-10-CM

## 2024-03-18 DIAGNOSIS — Z79.82 LONG TERM (CURRENT) USE OF ASPIRIN: ICD-10-CM

## 2024-03-18 DIAGNOSIS — Z90.49 ACQUIRED ABSENCE OF OTHER SPECIFIED PARTS OF DIGESTIVE TRACT: ICD-10-CM

## 2024-03-18 DIAGNOSIS — I10 ESSENTIAL (PRIMARY) HYPERTENSION: ICD-10-CM

## 2024-03-18 DIAGNOSIS — I49.5 SICK SINUS SYNDROME: ICD-10-CM

## 2024-03-19 NOTE — PHYSICAL EXAM
[] : no respiratory distress [Heart Rate And Rhythm] : heart rate was normal and rhythm regular [Auscultation Breath Sounds / Voice Sounds] : lungs were clear to auscultation bilaterally [Heart Sounds] : normal S1 and S2 [Heart Sounds Gallop] : no gallops [Murmurs] : no murmurs [Heart Sounds Pericardial Friction Rub] : no pericardial rub [Clean] : clean [Dry] : dry [Healing Well] : healing well [No Edema] : no edema [FreeTextEntry1] : L CW

## 2024-03-19 NOTE — COUNSELING
[Importance of Regular Medical Follow-Up] : the importance of regular medical follow-up [Hygeine (Including Daily Shower)] : hygeine (including daily shower) [No Heavy Lifting] : no heavy lifting (>15-20 lb. for 1 month or 25 lb. for 3 months from date of surgery) [S/S of infection] : signs and symptoms of infection (and to whom it should be reported) [Progressive Ambulation/Activity] : progressive ambulation/activity [Blood Pressure Control] : blood pressure control [FreeTextEntry1] : SAUD VILLAVICENCIO is a 83 year F that arrives today s/p device implant. They were educated on site care. Incision site is to be washed daily with soap and water, pat dry. NO lotions, perfumes, or ointments to be applied to the incision site until completely healed, usual time frame is 3 weeks. On arrival patient denies fever, chills nausea, and vomiting. Denies SOB or palpitation. On initial implantation restrictions are 6 weeks of no lifting, pushing pulling anything 10lbs or greater. Also the patient is not allowed to lift their arm above heart level for 6 weeks. No driving for 4 weeks.Patient was instructed to follow up with their electrophysiologist as well as their cardiologist within the next 2-4 weeks.

## 2024-03-19 NOTE — REASON FOR VISIT
[Family Member] : family member [de-identified] : S/P Implantation of dual chamber permanent pacemaker [de-identified] : 3/8/2024

## 2024-03-19 NOTE — ASSESSMENT
[FreeTextEntry1] :  83y Female with PMH of HTN, HLD, DM, s/p cholecystectomy who presented to the ED s/p syncopal episode. Patient reported she was walking in bedroom when she felt lightheaded and nauseous then syncopized, hitting her head on a cardboard box. Patient's son heard the fall and found her on the floor, reported patient awoke almost immediately. Patient recalled the event and did not report any confusion post event. Denies any bowel/bladder incontinence, no tongue biting. Patient is being followed by cardiology as she had a syncopal event last month, follows with Dr. Lan and Dr. La, found to have symptomatic bradycardia and was scheduled for pacemaker next tuesday. In the ED, VSS, trops 25--> 25, CT head/neck negative for acute pathology, EKG LVH with repolarization abnormality. Cardiology was consulted for syncope and pauses on MCOT previously, recommending EP for PPM placement. Patient presents to office today, s/p implantation of dual chamber permanent pacemaker with Dr. Lopez.   Arrives with daughter c/o dizziness x 1 episode on Wednesday which is why she was unable to come to [place] that day denies syncope, CP, SOB, Palpitations  incision is healing well  Incisional Education reinforced  Wash daily soap and water and notify if any changes  Maintain precautions  F/U EPS Dr. La: appointment moved up to today for device interrogation d/t dizziness  F/U CTS PRN D/W Dr. Lopez

## 2024-06-28 ENCOUNTER — APPOINTMENT (OUTPATIENT)
Dept: ELECTROPHYSIOLOGY | Facility: CLINIC | Age: 84
End: 2024-06-28
Payer: MEDICARE

## 2024-06-28 VITALS
TEMPERATURE: 97.1 F | HEIGHT: 59 IN | BODY MASS INDEX: 33.06 KG/M2 | HEART RATE: 76 BPM | SYSTOLIC BLOOD PRESSURE: 104 MMHG | DIASTOLIC BLOOD PRESSURE: 70 MMHG | WEIGHT: 164 LBS

## 2024-06-28 DIAGNOSIS — R55 SYNCOPE AND COLLAPSE: ICD-10-CM

## 2024-06-28 DIAGNOSIS — Z45.018 ENCOUNTER FOR ADJUSTMENT AND MANAGEMENT OF OTHER PART OF CARDIAC PACEMAKER: ICD-10-CM

## 2024-06-28 DIAGNOSIS — Z86.79 PERSONAL HISTORY OF OTHER DISEASES OF THE CIRCULATORY SYSTEM: ICD-10-CM

## 2024-06-28 PROCEDURE — 93280 PM DEVICE PROGR EVAL DUAL: CPT

## 2024-06-28 PROCEDURE — 99214 OFFICE O/P EST MOD 30 MIN: CPT | Mod: 25

## 2024-06-28 PROCEDURE — 93000 ELECTROCARDIOGRAM COMPLETE: CPT | Mod: 59

## 2024-08-16 ENCOUNTER — APPOINTMENT (OUTPATIENT)
Dept: ORTHOPEDIC SURGERY | Facility: CLINIC | Age: 84
End: 2024-08-16
Payer: MEDICARE

## 2024-08-16 DIAGNOSIS — M17.12 UNILATERAL PRIMARY OSTEOARTHRITIS, LEFT KNEE: ICD-10-CM

## 2024-08-16 PROCEDURE — 99213 OFFICE O/P EST LOW 20 MIN: CPT

## 2024-08-16 NOTE — HISTORY OF PRESENT ILLNESS
[de-identified] : 84F with left knee pain secondary to oa has had multiple injections now considering tka  exam shows jlt pain prom +swelling  x-rays demonstrate severe oa  impression left knee oa  plan: surgery discussed she will have to decide if she wants to proceed

## 2024-09-24 ENCOUNTER — APPOINTMENT (OUTPATIENT)
Dept: ORTHOPEDIC SURGERY | Facility: CLINIC | Age: 84
End: 2024-09-24

## 2024-09-24 DIAGNOSIS — M17.12 UNILATERAL PRIMARY OSTEOARTHRITIS, LEFT KNEE: ICD-10-CM

## 2024-09-24 PROCEDURE — 99213 OFFICE O/P EST LOW 20 MIN: CPT | Mod: 25

## 2024-09-24 PROCEDURE — 20610 DRAIN/INJ JOINT/BURSA W/O US: CPT | Mod: LT

## 2024-09-24 NOTE — HISTORY OF PRESENT ILLNESS
[de-identified] : left knee synvisc one  Prior to injection, risks and benefits of the procedure were discussed, including but not limited to pain, swelling, hematoma, failure to improve symptoms, and in rare cases infection or an allergic or inflammatory  reaction to the medication.   The knee was prepped and draped with betadine and alcohol. Using sterile technique, the pre-prepared viscosupplement injection was injected thru an anterolateral portal using a 22gauage needle. Upon withdrawing the needle pressure was applied to the injection site for approximately 1 minute. Once hemostasis was achieved a band-aid dressing was applied. The patient tolerated the procedure well.  f/u in 6months

## 2024-09-27 ENCOUNTER — APPOINTMENT (OUTPATIENT)
Dept: CARDIOLOGY | Facility: CLINIC | Age: 84
End: 2024-09-27

## 2024-09-27 PROCEDURE — 93296 REM INTERROG EVL PM/IDS: CPT

## 2024-09-27 PROCEDURE — 93294 REM INTERROG EVL PM/LDLS PM: CPT

## 2024-12-27 ENCOUNTER — APPOINTMENT (OUTPATIENT)
Dept: CARDIOLOGY | Facility: CLINIC | Age: 84
End: 2024-12-27
Payer: MEDICARE

## 2024-12-27 ENCOUNTER — NON-APPOINTMENT (OUTPATIENT)
Age: 84
End: 2024-12-27

## 2024-12-27 PROCEDURE — 93294 REM INTERROG EVL PM/LDLS PM: CPT

## 2024-12-27 PROCEDURE — 93296 REM INTERROG EVL PM/IDS: CPT

## 2025-04-08 ENCOUNTER — APPOINTMENT (OUTPATIENT)
Dept: CARDIOLOGY | Facility: CLINIC | Age: 85
End: 2025-04-08

## 2025-04-08 ENCOUNTER — NON-APPOINTMENT (OUTPATIENT)
Age: 85
End: 2025-04-08

## 2025-04-08 PROCEDURE — 93296 REM INTERROG EVL PM/IDS: CPT

## 2025-04-08 PROCEDURE — 93294 REM INTERROG EVL PM/LDLS PM: CPT

## 2025-04-28 ENCOUNTER — APPOINTMENT (OUTPATIENT)
Facility: CLINIC | Age: 85
End: 2025-04-28
Payer: MEDICARE

## 2025-04-28 ENCOUNTER — NON-APPOINTMENT (OUTPATIENT)
Age: 85
End: 2025-04-28

## 2025-04-28 PROCEDURE — 92250 FUNDUS PHOTOGRAPHY W/I&R: CPT

## 2025-04-28 PROCEDURE — 76512 OPH US DX B-SCAN: CPT | Mod: LT

## 2025-04-28 PROCEDURE — 99214 OFFICE O/P EST MOD 30 MIN: CPT

## 2025-07-11 ENCOUNTER — NON-APPOINTMENT (OUTPATIENT)
Age: 85
End: 2025-07-11

## 2025-07-11 ENCOUNTER — APPOINTMENT (OUTPATIENT)
Dept: CARDIOLOGY | Facility: CLINIC | Age: 85
End: 2025-07-11

## 2025-07-11 PROCEDURE — 93294 REM INTERROG EVL PM/LDLS PM: CPT

## 2025-07-11 PROCEDURE — 93296 REM INTERROG EVL PM/IDS: CPT
